# Patient Record
Sex: MALE | Race: WHITE | Employment: STUDENT | URBAN - METROPOLITAN AREA
[De-identification: names, ages, dates, MRNs, and addresses within clinical notes are randomized per-mention and may not be internally consistent; named-entity substitution may affect disease eponyms.]

---

## 2023-05-01 DIAGNOSIS — Q90.9 DOWN'S SYNDROME (HHS-HCC): Primary | ICD-10-CM

## 2023-05-01 PROBLEM — R62.50 DEVELOPMENT DELAY: Status: ACTIVE | Noted: 2023-05-01

## 2023-05-01 PROBLEM — L21.9 SBD (SEBORRHEIC DERMATITIS): Status: ACTIVE | Noted: 2023-05-01

## 2023-05-01 PROBLEM — L73.2 HIDRADENITIS AXILLARIS: Status: ACTIVE | Noted: 2023-05-01

## 2023-05-01 PROBLEM — R29.898 HYPOTONIA: Status: ACTIVE | Noted: 2023-05-01

## 2023-05-01 PROBLEM — F63.3 TRICHOTILLOMANIA IN PEDIATRIC PATIENT: Status: ACTIVE | Noted: 2023-05-01

## 2023-05-01 PROBLEM — E55.9 VITAMIN D DEFICIENCY: Status: ACTIVE | Noted: 2023-05-01

## 2023-05-01 PROBLEM — M62.89 HYPOTONIA: Status: ACTIVE | Noted: 2023-05-01

## 2023-05-01 PROBLEM — R46.89 OUTBURSTS OF EXPLOSIVE BEHAVIOR: Status: ACTIVE | Noted: 2023-05-01

## 2023-05-01 RX ORDER — BENZOYL PEROXIDE 50 MG/ML
LIQUID TOPICAL
COMMUNITY
Start: 2023-03-21

## 2023-05-01 RX ORDER — ASPIRIN 81 MG
TABLET, DELAYED RELEASE (ENTERIC COATED) ORAL
COMMUNITY
Start: 2021-05-05

## 2023-05-01 RX ORDER — ESCITALOPRAM OXALATE 5 MG/5ML
10 SOLUTION ORAL DAILY
COMMUNITY
End: 2023-07-17 | Stop reason: ALTCHOICE

## 2023-05-01 RX ORDER — MUPIROCIN 20 MG/G
1 OINTMENT TOPICAL SEE ADMIN INSTRUCTIONS
COMMUNITY
Start: 2022-06-02

## 2023-05-01 RX ORDER — DOXYCYCLINE 100 MG/1
100 CAPSULE ORAL DAILY
COMMUNITY
Start: 2022-06-02 | End: 2023-07-13 | Stop reason: WASHOUT

## 2023-05-01 RX ORDER — MINOCYCLINE HYDROCHLORIDE 50 MG/1
CAPSULE ORAL
COMMUNITY
Start: 2023-03-21 | End: 2023-12-14 | Stop reason: SDUPTHER

## 2023-05-01 NOTE — TELEPHONE ENCOUNTER
Mom notified, mailed to home address per parent request. Will call office with further questions.

## 2023-07-13 ENCOUNTER — OFFICE VISIT (OUTPATIENT)
Dept: PEDIATRICS | Facility: CLINIC | Age: 18
End: 2023-07-13
Payer: COMMERCIAL

## 2023-07-13 VITALS
SYSTOLIC BLOOD PRESSURE: 112 MMHG | HEIGHT: 65 IN | BODY MASS INDEX: 34.67 KG/M2 | WEIGHT: 208.1 LBS | HEART RATE: 94 BPM | DIASTOLIC BLOOD PRESSURE: 76 MMHG

## 2023-07-13 DIAGNOSIS — E66.9 OBESITY, UNSPECIFIED CLASSIFICATION, UNSPECIFIED OBESITY TYPE, UNSPECIFIED WHETHER SERIOUS COMORBIDITY PRESENT: ICD-10-CM

## 2023-07-13 DIAGNOSIS — Q90.9 DOWN'S SYNDROME (HHS-HCC): Primary | ICD-10-CM

## 2023-07-13 DIAGNOSIS — Z00.00 WELL ADULT EXAM: ICD-10-CM

## 2023-07-13 PROBLEM — Q26.9: Status: ACTIVE | Noted: 2019-09-16

## 2023-07-13 PROBLEM — Q42.3: Status: RESOLVED | Noted: 2022-03-31 | Resolved: 2023-07-13

## 2023-07-13 PROBLEM — K01.1 IMPACTED MOLAR: Status: RESOLVED | Noted: 2019-09-16 | Resolved: 2023-07-13

## 2023-07-13 PROBLEM — K00.9 DENTAL ANOMALY: Status: ACTIVE | Noted: 2019-09-16

## 2023-07-13 PROCEDURE — 1036F TOBACCO NON-USER: CPT | Performed by: PEDIATRICS

## 2023-07-13 PROCEDURE — 3008F BODY MASS INDEX DOCD: CPT | Performed by: PEDIATRICS

## 2023-07-13 PROCEDURE — 99385 PREV VISIT NEW AGE 18-39: CPT | Performed by: PEDIATRICS

## 2023-07-13 NOTE — PATIENT INSTRUCTIONS
Gurdeep was seen today for well child.  Diagnoses and all orders for this visit:  Down's syndrome (Primary)  -     CBC and Auto Differential; Future  -     TSH; Future  -     Thyroxine, Free; Future  Obesity, unspecified classification, unspecified obesity type, unspecified whether serious comorbidity present  -     Lipid Panel; Future  -     Vitamin D, Total; Future  Well adult exam  Body mass index, pediatric, greater than or equal to 95th percentile for age

## 2023-07-13 NOTE — PROGRESS NOTES
Here per self  General Health:  Overall healthy: yes  Concerns today:  Social and Family History:  Any changes?  Nutrition:  Well balanced diet and adequate calcium for age: yes  Dental Care:  Regular dental visits: yes  Brushes twice daily: yes  Elimination:  Elimination patterns appropriate: yes  Sleep:  Adequate sleep: yes  Sleep problems: no      Education/work:  Graduated high school: [employer]  jobs around community    Working:  Activities:  Physical Activity:  Social activities:      RISK factors:  Dating?   Sexual activity?          If yes, form of birth control:  Alcohol?  no  Marijuana? no  Drugs?  no  Smoking? no  Vaping? no    Safety Assessment:  Safety topics were reviewed  Seat belt: yes     Exposure to pets:   Smoke detectors: yes   Sun safety/ Sunscreen: yes   Water safety: yes  Firearms in house:     Internet and texting safety: yes     Review of Systems:  Constitutional: Otherwise denies fever, chills, or changes in behavior. No difficulties with sleeping, eating, drinking, urine output, or bowel movements.    Eyes, ENT: Denies eye complaints, ear complaints, nasal congestion, runny nose, or sore throat.   Cardio/Resp: Denies chest pain, palpitations, shortness of breath, wheezing, stridor at rest, cough, working hard to breathe, or breathing fast.   GI/Renal: Denies nausea, vomiting, stomachache, diarrhea, or constipation. Denies dysuria or abnormal urine color or smell.   Musculoskeletal/Skin: Denies muscle or joint complaints. Denies skin rash.   Neuro/Psych: Denies headache, dizziness, or confusion.  Endo/heme/lymph: Denies excessive thirst, excessive sweating, bruising, bleeding, or swollen glands.     Physical Exam  Vitals reviewed.   Constitutional:          Appearance: Normal appearance  HENT:      Head: Normocephalic.      Right Ear: External ear normal and without deformities. Normal TM.      Left Ear: External ear normal and without deformities. Normal TM.      Nose: Nose normal,  patent nares and without deformities.      Mouth/Throat: Normal palate     Mouth: Mucous membranes are moist.      Pharynx: Oropharynx is clear.   Neck:     General: Normal. No lymphadenopathy.     Eyes:      Extraocular Movements: Extraocular movements intact.      Conjunctiva/sclera: Conjunctivae normal.      Pupils: Pupils are equal, round, and reactive to light.   Cardiovascular:      Rate and Rhythm: Normal rate and regular rhythm.      Pulses: Normal pulses.      Heart sounds: Normal heart sounds.   Pulmonary:      Effort: Pulmonary effort is normal.      Breath sounds: Normal breath sounds.   Abdominal:      General: Abdomen is flat.      Palpations: Abdomen is soft.   Genitourinary:     Normal genitalia  Musculoskeletal:         General: Normal range of motion, strength and tone.     Cervical back: Normal range of motion and neck supple.   Skin:     General: Skin is warm and dry.      No rash or lesions        Neurological:      General: No focal deficit present.      Mental Status:      Assessment/Plan:   Gurdeep was seen today for well child.  Diagnoses and all orders for this visit:  Down's syndrome (Primary)  -     CBC and Auto Differential; Future  -     TSH; Future  -     Thyroxine, Free; Future  Obesity, unspecified classification, unspecified obesity type, unspecified whether serious comorbidity present  -     Lipid Panel; Future  -     Vitamin D, Total; Future  Well adult exam  Body mass index, pediatric, greater than or equal to 95th percentile for age

## 2023-07-15 ENCOUNTER — LAB (OUTPATIENT)
Dept: LAB | Facility: LAB | Age: 18
End: 2023-07-15
Payer: COMMERCIAL

## 2023-07-15 DIAGNOSIS — E66.9 OBESITY, UNSPECIFIED CLASSIFICATION, UNSPECIFIED OBESITY TYPE, UNSPECIFIED WHETHER SERIOUS COMORBIDITY PRESENT: ICD-10-CM

## 2023-07-15 DIAGNOSIS — Q90.9 DOWN'S SYNDROME (HHS-HCC): ICD-10-CM

## 2023-07-15 LAB
BASOPHILS (10*3/UL) IN BLOOD BY AUTOMATED COUNT: 0.04 X10E9/L (ref 0–0.1)
BASOPHILS/100 LEUKOCYTES IN BLOOD BY AUTOMATED COUNT: 0.7 % (ref 0–2)
CALCIDIOL (25 OH VITAMIN D3) (NG/ML) IN SER/PLAS: 29 NG/ML
CHOLESTEROL (MG/DL) IN SER/PLAS: 182 MG/DL (ref 0–199)
CHOLESTEROL IN HDL (MG/DL) IN SER/PLAS: 32.6 MG/DL
CHOLESTEROL/HDL RATIO: 5.6
EOSINOPHILS (10*3/UL) IN BLOOD BY AUTOMATED COUNT: 0.03 X10E9/L (ref 0–0.7)
EOSINOPHILS/100 LEUKOCYTES IN BLOOD BY AUTOMATED COUNT: 0.5 % (ref 0–6)
ERYTHROCYTE DISTRIBUTION WIDTH (RATIO) BY AUTOMATED COUNT: 14.7 % (ref 11.5–14.5)
ERYTHROCYTE MEAN CORPUSCULAR HEMOGLOBIN CONCENTRATION (G/DL) BY AUTOMATED: 33.1 G/DL (ref 32–36)
ERYTHROCYTE MEAN CORPUSCULAR VOLUME (FL) BY AUTOMATED COUNT: 90 FL (ref 80–100)
ERYTHROCYTES (10*6/UL) IN BLOOD BY AUTOMATED COUNT: 4.79 X10E12/L (ref 4.5–5.9)
HEMATOCRIT (%) IN BLOOD BY AUTOMATED COUNT: 43.2 % (ref 41–52)
HEMOGLOBIN (G/DL) IN BLOOD: 14.3 G/DL (ref 13.5–17.5)
IMMATURE GRANULOCYTES/100 LEUKOCYTES IN BLOOD BY AUTOMATED COUNT: 0.5 % (ref 0–0.9)
LDL: 105 MG/DL (ref 0–109)
LEUKOCYTES (10*3/UL) IN BLOOD BY AUTOMATED COUNT: 5.9 X10E9/L (ref 4.4–11.3)
LYMPHOCYTES (10*3/UL) IN BLOOD BY AUTOMATED COUNT: 2.15 X10E9/L (ref 1.2–4.8)
LYMPHOCYTES/100 LEUKOCYTES IN BLOOD BY AUTOMATED COUNT: 36.8 % (ref 13–44)
MONOCYTES (10*3/UL) IN BLOOD BY AUTOMATED COUNT: 0.31 X10E9/L (ref 0.1–1)
MONOCYTES/100 LEUKOCYTES IN BLOOD BY AUTOMATED COUNT: 5.3 % (ref 2–10)
NEUTROPHILS (10*3/UL) IN BLOOD BY AUTOMATED COUNT: 3.29 X10E9/L (ref 1.2–7.7)
NEUTROPHILS/100 LEUKOCYTES IN BLOOD BY AUTOMATED COUNT: 56.2 % (ref 40–80)
NON HDL CHOLESTEROL: 149 MG/DL (ref 0–119)
NRBC (PER 100 WBCS) BY AUTOMATED COUNT: 0 /100 WBC (ref 0–0)
PLATELETS (10*3/UL) IN BLOOD AUTOMATED COUNT: 296 X10E9/L (ref 150–450)
THYROTROPIN (MIU/L) IN SER/PLAS BY DETECTION LIMIT <= 0.05 MIU/L: 3.02 MIU/L (ref 0.44–3.98)
THYROXINE (T4) FREE (NG/DL) IN SER/PLAS: 1.22 NG/DL (ref 0.78–1.48)
TRIGLYCERIDE (MG/DL) IN SER/PLAS: 224 MG/DL (ref 0–149)
VLDL: 45 MG/DL (ref 0–40)

## 2023-07-15 PROCEDURE — 36415 COLL VENOUS BLD VENIPUNCTURE: CPT

## 2023-07-15 PROCEDURE — 80061 LIPID PANEL: CPT

## 2023-07-15 PROCEDURE — 85025 COMPLETE CBC W/AUTO DIFF WBC: CPT

## 2023-07-15 PROCEDURE — 84443 ASSAY THYROID STIM HORMONE: CPT

## 2023-07-15 PROCEDURE — 82306 VITAMIN D 25 HYDROXY: CPT

## 2023-07-15 PROCEDURE — 84439 ASSAY OF FREE THYROXINE: CPT

## 2023-07-17 DIAGNOSIS — F41.9 ANXIETY: Primary | ICD-10-CM

## 2023-07-17 DIAGNOSIS — E78.2 ELEVATED TRIGLYCERIDES WITH HIGH CHOLESTEROL: Primary | ICD-10-CM

## 2023-07-17 DIAGNOSIS — Q90.9 DOWN'S SYNDROME (HHS-HCC): ICD-10-CM

## 2023-07-17 RX ORDER — ESCITALOPRAM OXALATE 10 MG/1
10 TABLET ORAL DAILY
Qty: 90 TABLET | Refills: 0 | Status: SHIPPED | OUTPATIENT
Start: 2023-07-17 | End: 2023-11-09 | Stop reason: SDUPTHER

## 2023-10-16 DIAGNOSIS — Q90.9 DOWN SYNDROME (HHS-HCC): ICD-10-CM

## 2023-10-16 DIAGNOSIS — M62.89 HYPOTONIA: ICD-10-CM

## 2023-11-09 DIAGNOSIS — F41.9 ANXIETY: ICD-10-CM

## 2023-11-09 RX ORDER — ESCITALOPRAM OXALATE 10 MG/1
10 TABLET ORAL DAILY
Qty: 90 TABLET | Refills: 0 | Status: SHIPPED | OUTPATIENT
Start: 2023-11-09 | End: 2024-03-05 | Stop reason: SDUPTHER

## 2023-11-10 ENCOUNTER — OFFICE VISIT (OUTPATIENT)
Dept: ENDOCRINOLOGY | Facility: CLINIC | Age: 18
End: 2023-11-10
Payer: COMMERCIAL

## 2023-11-10 VITALS
SYSTOLIC BLOOD PRESSURE: 111 MMHG | BODY MASS INDEX: 36.37 KG/M2 | DIASTOLIC BLOOD PRESSURE: 62 MMHG | WEIGHT: 213 LBS | HEART RATE: 79 BPM | HEIGHT: 64 IN

## 2023-11-10 DIAGNOSIS — E78.1 HYPERTRIGLYCERIDEMIA: Primary | ICD-10-CM

## 2023-11-10 DIAGNOSIS — E66.9 OBESITY (BMI 30-39.9): ICD-10-CM

## 2023-11-10 PROCEDURE — 3008F BODY MASS INDEX DOCD: CPT | Performed by: STUDENT IN AN ORGANIZED HEALTH CARE EDUCATION/TRAINING PROGRAM

## 2023-11-10 PROCEDURE — 1036F TOBACCO NON-USER: CPT | Performed by: STUDENT IN AN ORGANIZED HEALTH CARE EDUCATION/TRAINING PROGRAM

## 2023-11-10 PROCEDURE — 99204 OFFICE O/P NEW MOD 45 MIN: CPT | Performed by: STUDENT IN AN ORGANIZED HEALTH CARE EDUCATION/TRAINING PROGRAM

## 2023-11-10 RX ORDER — FERROUS SULFATE, DRIED 160(50) MG
1 TABLET, EXTENDED RELEASE ORAL DAILY
COMMUNITY

## 2023-11-10 ASSESSMENT — PAIN SCALES - GENERAL: PAINLEVEL: 0-NO PAIN

## 2023-11-10 NOTE — PROGRESS NOTES
18 M PMH: Downs sydnrome, obesity, hidranetitis suppurativa     Coming in today for evaluation of hyperTG, referred by PCP   Here today with mother     Lipid panel was done as part of routine screening showing ,    Even years prior TG trending in the 200s     Contributing meds:  None   Lexapro- 5 to 6 years     Around 208 lbs     Exercise-none, sedentary, uses time with uses gadget. 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    Social History     Socioeconomic History    Marital status: Single     Spouse name: Not on file    Number of children: Not on file    Years of education: Not on file    Highest education level: Not on file   Occupational History    Not on file   Tobacco Use    Smoking status: Never    Smokeless tobacco: Never   Vaping Use    Vaping Use: Never used   Substance and Sexual Activity    Alcohol use: Not on file    Drug use: Not on file    Sexual activity: Never   Other Topics Concern    Not on file   Social History Narrative    Not on file     Social Determinants of Health     Financial Resource Strain: Not on file   Food Insecurity: Not on file   Transportation Needs:  Not on file   Physical Activity: Not on file   Stress: Not on file   Social Connections: Not on file   Intimate Partner Violence: Not on file   Housing Stability: Not on file   Famhx: CAD maternal grandfather, maternal aunt CAD 49, maternal uncle 50 yo     No complaints   ROS reviewed and is negative except for pertinent findings noted on HPI    Physical Exam  Constitutional:       Comments: Limited to answering simple questions   Cardiovascular:      Rate and Rhythm: Regular rhythm.      Pulses: Normal pulses.      Heart sounds: Normal heart sounds.   Pulmonary:      Effort: Pulmonary effort is normal.      Breath sounds: Normal breath sounds.   Abdominal:      Comments: Abdominal obesity, soft    Musculoskeletal:         General: No swelling. Normal range of motion.      Comments: No proximal myopathy   Skin:     General: Skin is warm.      Comments: No violaceous striae   Neurological:      General: No focal deficit present.      Mental Status: He is alert.         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

## 2023-11-10 NOTE — PATIENT INSTRUCTIONS
-See the nutritionist  -want you to start a low fat and carb controlled diet   -increase your activity!  -get your fasting labs done     Follow up in about 5 months     Joanne Larry MD  Divison of Endocrinology   [facility]   Phone: [phone]    option 4, then option 1  Fax: [phone]

## 2023-12-14 ENCOUNTER — OFFICE VISIT (OUTPATIENT)
Dept: DERMATOLOGY | Facility: CLINIC | Age: 18
End: 2023-12-14
Payer: COMMERCIAL

## 2023-12-14 DIAGNOSIS — L85.8 KERATOSIS PILARIS: ICD-10-CM

## 2023-12-14 DIAGNOSIS — B35.3 TINEA PEDIS OF LEFT FOOT: ICD-10-CM

## 2023-12-14 DIAGNOSIS — L73.2 HIDRADENITIS SUPPURATIVA: Primary | ICD-10-CM

## 2023-12-14 PROCEDURE — 1036F TOBACCO NON-USER: CPT | Performed by: NURSE PRACTITIONER

## 2023-12-14 PROCEDURE — 99214 OFFICE O/P EST MOD 30 MIN: CPT | Performed by: NURSE PRACTITIONER

## 2023-12-14 PROCEDURE — 3008F BODY MASS INDEX DOCD: CPT | Performed by: NURSE PRACTITIONER

## 2023-12-14 RX ORDER — MINOCYCLINE HYDROCHLORIDE 50 MG/1
CAPSULE ORAL
Qty: 60 CAPSULE | Refills: 1 | Status: SHIPPED | OUTPATIENT
Start: 2023-12-14

## 2023-12-14 RX ORDER — HYDROCORTISONE 25 MG/G
CREAM TOPICAL
Qty: 453 G | Refills: 2 | Status: SHIPPED | OUTPATIENT
Start: 2023-12-14

## 2023-12-14 RX ORDER — KETOCONAZOLE 20 MG/G
CREAM TOPICAL
Qty: 15 G | Refills: 3 | Status: SHIPPED | OUTPATIENT
Start: 2023-12-14

## 2023-12-14 NOTE — PROGRESS NOTES
"Subjective     Gurdeep Pritchett is a 18 y.o. male who presents for the following: Hidradenitis Suppurativa (HS follow-up. Stable on current medication regimen. Mom would like examination of \"bumps\" to bilateral arms. ).     Review of Systems:  No other skin or systemic complaints other than what is documented elsewhere in the note.    The following portions of the chart were reviewed this encounter and updated as appropriate:         Skin Cancer History  No skin cancer on file.      Specialty Problems          Dermatology Problems    Hidradenitis axillaris    SBD (seborrheic dermatitis)        Objective   Well appearing patient in no apparent distress; mood and affect are within normal limits.    A full examination was performed including scalp, head, eyes, ears, nose, lips, neck, chest, axillae, abdomen, back, buttocks, bilateral upper extremities, bilateral lower extremities, hands, feet, fingers, toes, fingernails, and toenails. All findings within normal limits unless otherwise noted below.    Assessment/Plan   1. Hidradenitis suppurativa  Left Axilla, Right Axilla, Right Medial Thigh  Dilated comedones, inflammatory papules, sinus tract formation, scarring    - Hidradenitis suppurativa is a chronic condition of clogged sweat glands that leads to inflammation of the skin in areas such as the groin, underarms, underneath the breasts, and in between the buttocks. It most commonly appears as multiple large nodules (solid, raised bumps), abscesses (red, swollen, warm, tender bumps or lumps with pus inside), and tunnels (holes in the skin that may contain fluid such as pus) in these areas. The nodules and abscesses gradually get larger and drain pus. After multiple bouts of this cycle of plugging, enlargement, and drainage, there may be tunnel formation under the skin and scarring. Pain is the most common symptom, but individuals with hidradenitis suppurativa also report itchy lesions, a foul odor coming from lesions " when they drain pus, feeling tired, and joint pain.  - While there is no cure for hidradenitis suppurativa, you can work with your medical professional to treat existing lesions and prevent new ones.  - Make sure to wash any inflamed, draining areas of hidradenitis suppurativa with antibacterial soap, and then apply an antibiotic ointment (Neosporin) and clean bandages. If there is a large amount of drainage, change the gauze pads and dressings often. Warm compresses and ibuprofen (Advil, Motrin) can help reduce the swelling. Avoid wearing tight-fitting clothing to help prevent further irritation.  - Weight loss may decrease lesions by decreasing skin folds and, thus, friction on the skin. Smoking should be avoided.  Plan  - Discussed treatment options, including Cosentyx. Mom will call with questions/concerns.   - Will reach out to our trials team, mom is interested.   - Continue clindamycin lotion, use as directed.   - Will send refill of minocycline, take as discussed.   - Risks, benefits, and side effects discussed in office.     Follow Up In Dermatology - Established Patient - Left Axilla, Right Axilla, Right Medial Thigh    2. Tinea pedis of left foot    3. Keratosis pilaris

## 2023-12-26 ENCOUNTER — TELEPHONE (OUTPATIENT)
Dept: PEDIATRICS | Facility: CLINIC | Age: 18
End: 2023-12-26

## 2023-12-26 ENCOUNTER — OFFICE VISIT (OUTPATIENT)
Dept: PEDIATRICS | Facility: CLINIC | Age: 18
End: 2023-12-26
Payer: COMMERCIAL

## 2023-12-26 VITALS
HEART RATE: 85 BPM | TEMPERATURE: 98 F | OXYGEN SATURATION: 95 % | RESPIRATION RATE: 20 BRPM | BODY MASS INDEX: 35.36 KG/M2 | WEIGHT: 206 LBS

## 2023-12-26 DIAGNOSIS — J98.8 WHEEZING-ASSOCIATED RESPIRATORY INFECTION: Primary | ICD-10-CM

## 2023-12-26 PROCEDURE — 99213 OFFICE O/P EST LOW 20 MIN: CPT | Performed by: PEDIATRICS

## 2023-12-26 PROCEDURE — 1036F TOBACCO NON-USER: CPT | Performed by: PEDIATRICS

## 2023-12-26 PROCEDURE — 3008F BODY MASS INDEX DOCD: CPT | Performed by: PEDIATRICS

## 2023-12-26 RX ORDER — AZITHROMYCIN 200 MG/5ML
POWDER, FOR SUSPENSION ORAL
Qty: 37.7 ML | Refills: 0 | Status: SHIPPED | OUTPATIENT
Start: 2023-12-26 | End: 2023-12-31

## 2023-12-26 RX ORDER — ALBUTEROL SULFATE 0.83 MG/ML
2.5 SOLUTION RESPIRATORY (INHALATION) EVERY 4 HOURS PRN
Qty: 75 ML | Refills: 3 | Status: SHIPPED | OUTPATIENT
Start: 2023-12-26 | End: 2024-12-25

## 2023-12-26 ASSESSMENT — ENCOUNTER SYMPTOMS
MYALGIAS: 0
SORE THROAT: 0
COUGH: 1
EYE REDNESS: 0
APPETITE CHANGE: 0
ABDOMINAL PAIN: 0
HEADACHES: 0
NAUSEA: 0
FATIGUE: 1
VOMITING: 0
RHINORRHEA: 1
EYE DISCHARGE: 0
DIARRHEA: 0
FEVER: 0
SHORTNESS OF BREATH: 1

## 2023-12-26 NOTE — PATIENT INSTRUCTIONS
Give the antibiotic as prescribed.  Contact the office if symptoms are not improving over the next 2-3 days, or if any symptoms are worsening.  Give a probiotic supplement daily or eat yogurt daily  to help prevent stomach upset and diarrhea while on the antibiotic.  Give albuterol in the nebulizer every 4 hours as needed for cough or wheezing.  Contact the office with any concerns.

## 2023-12-26 NOTE — PROGRESS NOTES
Subjective   Patient ID: Gurdeep Pritchett is a 18 y.o. male with history of Down's syndrome  who presents for Cough.  He is accompanied today by his mother.    HPI:  Gurdeep presents with a cough starting about 4 days ago.  He had increased nasal congestion starting 2 days ago, and was noted to be wheezing today.              Review of Systems   Constitutional:  Positive for fatigue. Negative for appetite change and fever.   HENT:  Positive for congestion and rhinorrhea. Negative for ear pain and sore throat.    Eyes:  Negative for discharge and redness.   Respiratory:  Positive for cough and shortness of breath.    Gastrointestinal:  Negative for abdominal pain, diarrhea, nausea and vomiting.   Musculoskeletal:  Negative for myalgias.   Skin:  Negative for rash.   Neurological:  Negative for headaches.       Objective   Pulse 85   Temp 36.7 °C (98 °F)   Resp 20   Wt 93.4 kg (206 lb)   SpO2 95%   BMI 35.36 kg/m²   BSA: 2.05 meters squared  Growth percentiles: Facility age limit for growth %mary is 20 years. Facility age limit for growth %mary is 20 years.     Physical Exam  Vitals reviewed.   HENT:      Right Ear: Tympanic membrane normal.      Left Ear: Tympanic membrane normal.      Nose: Congestion present.      Mouth/Throat:      Mouth: Mucous membranes are moist.      Pharynx: Oropharynx is clear.   Eyes:      Conjunctiva/sclera: Conjunctivae normal.   Cardiovascular:      Rate and Rhythm: Normal rate and regular rhythm.      Heart sounds: Normal heart sounds.   Pulmonary:      Effort: Pulmonary effort is normal.      Breath sounds: Wheezing and rales present.   Musculoskeletal:      Cervical back: Neck supple.   Neurological:      Mental Status: He is alert.         Assessment/Plan   Diagnoses and all orders for this visit:  Wheezing-associated respiratory infection  -     albuterol 2.5 mg /3 mL (0.083 %) nebulizer solution; Take 3 mL (2.5 mg) by nebulization every 4 hours if needed for wheezing.  -      azithromycin (Zithromax) 200 mg/5 mL suspension; Take 12.5 mL (500 mg) by mouth once daily for 1 day, THEN 6.3 mL (250 mg) once daily for 4 days.

## 2023-12-29 ENCOUNTER — LAB (OUTPATIENT)
Dept: LAB | Facility: LAB | Age: 18
End: 2023-12-29
Payer: COMMERCIAL

## 2023-12-29 DIAGNOSIS — E78.1 HYPERTRIGLYCERIDEMIA: ICD-10-CM

## 2023-12-29 PROCEDURE — 83036 HEMOGLOBIN GLYCOSYLATED A1C: CPT

## 2023-12-29 PROCEDURE — 80061 LIPID PANEL: CPT

## 2023-12-29 PROCEDURE — 36415 COLL VENOUS BLD VENIPUNCTURE: CPT

## 2023-12-29 PROCEDURE — 82947 ASSAY GLUCOSE BLOOD QUANT: CPT

## 2023-12-30 LAB
CHOLEST SERPL-MCNC: 167 MG/DL (ref 0–199)
CHOLESTEROL/HDL RATIO: 5.3
EST. AVERAGE GLUCOSE BLD GHB EST-MCNC: 105 MG/DL
GLUCOSE P FAST SERPL-MCNC: 82 MG/DL (ref 74–99)
HBA1C MFR BLD: 5.3 %
HDLC SERPL-MCNC: 31.4 MG/DL
LDLC SERPL CALC-MCNC: 92 MG/DL
NON HDL CHOLESTEROL: 136 MG/DL (ref 0–119)
TRIGL SERPL-MCNC: 216 MG/DL (ref 0–149)
VLDL: 43 MG/DL (ref 0–40)

## 2024-01-23 ENCOUNTER — APPOINTMENT (OUTPATIENT)
Dept: NUTRITION | Facility: CLINIC | Age: 19
End: 2024-01-23
Payer: COMMERCIAL

## 2024-03-05 DIAGNOSIS — F41.9 ANXIETY: ICD-10-CM

## 2024-03-05 RX ORDER — ESCITALOPRAM OXALATE 10 MG/1
10 TABLET ORAL DAILY
Qty: 90 TABLET | Refills: 0 | Status: SHIPPED | OUTPATIENT
Start: 2024-03-05 | End: 2024-06-03

## 2024-03-19 ENCOUNTER — NUTRITION (OUTPATIENT)
Dept: NUTRITION | Facility: CLINIC | Age: 19
End: 2024-03-19
Payer: COMMERCIAL

## 2024-03-19 VITALS — HEIGHT: 64 IN | WEIGHT: 210.54 LBS | BODY MASS INDEX: 35.94 KG/M2

## 2024-03-19 DIAGNOSIS — E78.1 HYPERTRIGLYCERIDEMIA: ICD-10-CM

## 2024-03-19 PROCEDURE — 97802 MEDICAL NUTRITION INDIV IN: CPT | Performed by: DIETITIAN, REGISTERED

## 2024-03-19 NOTE — LETTER
March 19, 2024     Patient: Gurdeep Pritchett   YOB: 2005   Date of Visit: 3/19/2024       To Whom It May Concern:    Gurdeep Pritchett was seen in my clinic on 3/19/2024 at 8:30 am. Please excuse Gurdeep for his absence from school on this day to make the appointment.    If you have any questions or concerns, please don't hesitate to call.         Sincerely,         Christel Hines, RENATE, LD        CC: No Recipients

## 2024-03-19 NOTE — PROGRESS NOTES
"Reason for Nutrition Visit:  Pt is a 18 y.o. male being seen at [facility]. Pt was referred by  Joanne Larry MD effective March 19, 2024.   1. 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 Take by mouth 1 daily, Disp: , Rfl:     carbamide peroxide (Debrox) 6.5 % otic solution, Administer into affected ear(s)., Disp: , Rfl:     escitalopram (Lexapro) 10 mg tablet, Take 1 tablet (10 mg) by mouth once daily., Disp: 90 tablet, Rfl: 0    hydrocortisone 2.5 % cream, Thin coat to affected skin on arms and corners of mouth twice daily for 3-4 weeks as needed., Disp: 453 g, Rfl: 2    ketoconazole (NIZOral) 2 % cream, Daily to corners of mouth until controlled, then 1-2 times weekly., Disp: 15 g, Rfl: 3    minocycline 50 mg capsule, TAKE 1 Capsule twice a day by mouth with full glass of water. Don't take just prior to bedtime. Wear sunscreen while taking this medication., Disp: 60 capsule, Rfl: 1    mupirocin (Bactroban) 2 % ointment, Apply 1 Application topically see administration instructions. Apply as directed by physician, Disp: , Rfl:      Anthropometrics:  Anthropometrics  Height: 162.6 cm (5' 4.02\")  Weight: 95.5 kg (210 lb 8.6 oz)  BMI (Calculated): 36.12   Weight change:    Significant Weight Change: No    Lab Results " "  Component Value Date    HGBA1C 5.3 12/29/2023    CHOL 167 12/29/2023    LDLF 105 07/15/2023    TRIG 216 (H) 12/29/2023    HDL 31.4 12/29/2023        Chemistry    No results found for: \"NA\", \"K\", \"CL\", \"CO2\", \"BUN\", \"CREATININE\", \"GLU\" Lab Results   Component Value Date/Time    AST 20 10/01/2021 1417    ALT 29 10/01/2021 1417           Food and Nutrition Hx:  Pt is present with mom, Mar. Pt lives with mom.   Pt has preferences for food. He does like broccoli. He does not like a lot of healthy foods Mar prefers to eat.   He attends a day program that they cook and prepare foods. It is called [facility] [employer]. The food provided and consumes is not really known.   He likes brown beans, carrots, and green beans. He likes to include corn and peas. He likes apple sauce, oranges and banana. He consumes white fish and salmon.     Pt wakes up at 6:00.   3 meals are consumed and may be a snack.     24 Diet Recall:  Meal 1: Breakfast is by 7:00 am to include waffles with butter and milk or a poptart.   Meal 2: Lunch is a 11:30 to include a meal prepared at the consumed at the [employer]. The pt will have 8 items they select for and go shopping for the meals. Examples may be pasta, fish sandwich, cheese sandwiches.   Pt can take a nap in the afternoon.   Snack may be a cheese stick and corn chips and 5 slices of salami or Macaroni and cheese.   Meal 3: 2 cups of pasta with 3-4 meat balls and texas bread with cheese.    Snacks:  Beverages:Sugar free  Lemon-aide, diet orange pop, 2% milk, once in awhile chocolate milk.        Allergies: None  Intolerance: None  Appetite: Normal  Intake: >75%  GI Symptoms : diarrhea Frequency: frequent. Pt was born with GI issues. Bowel movements are type 5.   Swallowing Difficulty: Choking occurs rarely. Mom reports pt tends to eat fast. She cuts the food up on small bites to avoid chocking. Mom denies an hx of having a swallow study done.   Dentition : own    Types of Activities: Mostly " "Sedentary    Sleep duration/quality : 7+ hours and disrupted sleep    Supplements: Vitamin D and Prebiotic Fiber daily    Food Preparation: Family  Cooking Skills/Barriers: None reported  Grocery Shopping: Family    Eating Out Type: Restaurant  1 times per week    Nutrition Focused Physical Exam:    Performed/Deferred: Deferred as pt visually appears well-nourished with no signs of malnutrition    Other Physical Findings:  Hair: None  None  Mouth: None  Skin: None  Nails: None  none    Malnutrition Present: No    Estimated Energy Needs:  Energy Needs  Calculated Energy Needs Using Equations  Height: 162.6 cm (5' 4.02\")  Weight Used for Equation Calculations: 95.5 kg (210 lb 8.6 oz)  [employer] Equation (Overweight or Obese Patients): 1876  Equation Chosen to Use by RD: [employer]  Activity Factor: 1.2  Total Energy Needs: 2251  Estimated Energy Needs  Total Energy Estimated Needs (kCal): 1750 kCal     Nutrition Diagnosis:    Diagnosis Statement 1:  Diagnosis Status: New  Diagnosis : Food and nutrition related knowledge deficit related to  how to eat to reduce TG levels  as evidenced by  reports by pt's mother of the need to learn.    Diagnosis Statement 2:  Diagnosis Status: New  Diagnosis : Inadequate fiber intake  related to  preference of consumption of grains and starches with limited fiber intake  as evidenced by estimated intake of fiber that is insufficient when compared to recommended amounts (38 g/day for men and 25 g/day for women)    Diagnosis Statement 3:   Diagnosis Status: New  Diagnosis : Altered nutrition related lab values  related to  multiple nutrition factors such as high CHO intake, low fiber, overweight, high kcal intake and limited activity to name a few  as evidenced by  TG levels has been above target.     Nutrition Interventions:  Medical nutrition therapy was given for TG and for wt loss.   Nutrition Counseling: Motivational Interviewing and CBT  Coordination of Care: " None    Nutrition Monitoring and Evaluation:  Weight  TG levels  Energy in the afternoon    Nutrition Goals:  1,750 calories per day for 1 lb wt loss per week. Heart healthy meal plan with a low saturated fat intake to <5 -6 % of energy (less than 11 grams of saturated fat per day), reduced intake of added sugars (<10% of total energy), 38 grams of fiber with intake of viscous fiber to 5 g/day to 10 g/day, plant sterols/stanols to 2 g/day, and 1.6 gram of omega three fatty acids to reduce LDL cholesterol.    Nutrition Recommendations:  Via teach back method patient verbalized understanding of the following topics:  1) Aim for three meals and 1 snacks.   2) Strive to include protein at the meals and even snacks. Protein at meals can increase the metabolism.  Protein at snacks and meals can sustain energy, stabilize blood glucose, and provide more contentment. Protein foods include egg whites, low fat cheese, nuts, nut butters, lean poultry, lean meat, fish, or tofu. Other ways to obtain protein include [place] oatmeal, pancakes, and waffles that contain high protein within the grain. Consider switching from regular milk to Fairlife.   3) The recommendation for a high fiber diet for men is to consume 38 grams of fiber per day. In order to achieve this consume 2 -3 fruit per day, 3 -4 cups of vegetables, and whole grain at 1 -2 meals per day. Strive to offer fruit and both breakfast and snack. At dinner, consider doubling the amount of vegetables and offering it first.   4) Whole grain foods are foods that contain the whole grain kernel. In order to identify whole grain, examine the list of ingredients. The first ingredient needs to state one of the following to be considered a whole grain: Brown rice, bulgur, cristal flour, oatmeal, whole-grain corn, whole oats, whole rye, whole wheat, wild rice, or whole wheat flour.    Educational Handouts: Triglyceride Nutrition Therapy     Christel Hines MS, RDN, LD, HALEY    Advanced Practice Clinical Dietitian  [email or web address]  Schedule line [phone]  Direct line [phone]     Readiness to Change : Good  Level of Understanding : Good  Anticipated Compliant : Good

## 2024-03-19 NOTE — PATIENT INSTRUCTIONS
1) Aim for three meals and 1 snacks.   2) Strive to include protein at the meals and even snacks. Protein at meals can increase the metabolism.  Protein at snacks and meals can sustain energy, stabilize blood glucose, and provide more contentment. Protein foods include egg whites, low fat cheese, nuts, nut butters, lean poultry, lean meat, fish, or tofu. Other ways to obtain protein include [place] oatmeal, pancakes, and waffles that contain high protein within the grain. Consider switching from regular milk to Fairlife.   3) The recommendation for a high fiber diet for men is to consume 38 grams of fiber per day. In order to achieve this consume 2 -3 fruit per day, 3 -4 cups of vegetables, and whole grain at 1 -2 meals per day. Strive to offer fruit and both breakfast and snack. At dinner, consider doubling the amount of vegetables and offering it first.   4) Whole grain foods are foods that contain the whole grain kernel. In order to identify whole grain, examine the list of ingredients. The first ingredient needs to state one of the following to be considered a whole grain: Brown rice, bulgur, cristal flour, oatmeal, whole-grain corn, whole oats, whole rye, whole wheat, wild rice, or whole wheat flour.    Educational Handouts: Triglyceride Nutrition Therapy     Christel Hines, MS, RDN, LD, HALEY   Advanced Practice Clinical Dietitian  [email or web address]  Schedule line [phone]  Direct line [phone]

## 2024-04-12 ENCOUNTER — APPOINTMENT (OUTPATIENT)
Dept: ENDOCRINOLOGY | Facility: CLINIC | Age: 19
End: 2024-04-12
Payer: COMMERCIAL

## 2024-04-16 ENCOUNTER — APPOINTMENT (OUTPATIENT)
Dept: NUTRITION | Facility: CLINIC | Age: 19
End: 2024-04-16
Payer: COMMERCIAL

## 2024-06-03 ENCOUNTER — APPOINTMENT (OUTPATIENT)
Dept: DERMATOLOGY | Facility: CLINIC | Age: 19
End: 2024-06-03
Payer: COMMERCIAL

## 2024-07-10 DIAGNOSIS — L73.2 HIDRADENITIS SUPPURATIVA: ICD-10-CM

## 2024-07-11 RX ORDER — MINOCYCLINE HYDROCHLORIDE 50 MG/1
CAPSULE ORAL
Qty: 60 CAPSULE | Refills: 1 | Status: SHIPPED | OUTPATIENT
Start: 2024-07-11

## 2024-07-12 DIAGNOSIS — F41.9 ANXIETY: ICD-10-CM

## 2024-07-13 RX ORDER — ESCITALOPRAM OXALATE 10 MG/1
10 TABLET ORAL DAILY
Qty: 90 TABLET | Refills: 0 | Status: SHIPPED | OUTPATIENT
Start: 2024-07-13 | End: 2024-10-11

## 2024-07-15 ENCOUNTER — APPOINTMENT (OUTPATIENT)
Dept: PEDIATRICS | Facility: CLINIC | Age: 19
End: 2024-07-15
Payer: COMMERCIAL

## 2024-07-15 VITALS
SYSTOLIC BLOOD PRESSURE: 126 MMHG | DIASTOLIC BLOOD PRESSURE: 64 MMHG | BODY MASS INDEX: 34.99 KG/M2 | WEIGHT: 210 LBS | HEIGHT: 65 IN

## 2024-07-15 DIAGNOSIS — L30.8 PSORIASIFORM ECZEMA: ICD-10-CM

## 2024-07-15 DIAGNOSIS — Z00.00 WELL ADULT HEALTH CHECK: Primary | ICD-10-CM

## 2024-07-15 DIAGNOSIS — Q90.9 DOWN'S SYNDROME (HHS-HCC): ICD-10-CM

## 2024-07-15 PROCEDURE — 3008F BODY MASS INDEX DOCD: CPT | Performed by: PEDIATRICS

## 2024-07-15 PROCEDURE — 99395 PREV VISIT EST AGE 18-39: CPT | Performed by: PEDIATRICS

## 2024-07-15 PROCEDURE — 1036F TOBACCO NON-USER: CPT | Performed by: PEDIATRICS

## 2024-07-15 RX ORDER — BETAMETHASONE VALERATE 1.2 MG/G
OINTMENT TOPICAL 2 TIMES DAILY
Qty: 45 G | Refills: 2 | Status: SHIPPED | OUTPATIENT
Start: 2024-07-15 | End: 2025-07-15

## 2024-07-15 NOTE — PATIENT INSTRUCTIONS
Assessment/Plan:  Gurdeep was seen today for well child.  Diagnoses and all orders for this visit:  Well adult health check (Primary)  Down's syndrome ([facility])  -     Referral to Adult Sleep Medicine; Future  BMI 35.0-35.9,adult  -     Lipid Panel; Future  -     CBC and Auto Differential; Future  -     TSH; Future  -     Thyroxine, Free  Psoriasiform eczema  -     betamethasone valerate (Valisone) 0.1 % ointment; Apply topically 2 times a day. Apply to affected area  Other orders  -     1 Year Follow Up In Pediatrics; Future  Neutrogena t-gel to plaques  Follow up with endocrine and GI

## 2024-07-15 NOTE — PROGRESS NOTES
Here per self  General Health:  Overall healthy: yes  Concerns today:diet is aproblem for carbs chrissie Potatoes  Social and Family History:  Any changes?  Nutrition:  Well balanced diet and adequate calcium for age: yes  Dental Care:  Regular dental visits: yes  Brushes twice daily: yes  Elimination:  Elimination patterns appropriate: yes  Sleep:  Adequate sleep: yes  Sleep problems: no      Education/work:   [employer] program  Activities:  Physical Activity: baseball  Social activities:      RISK factors:  Dating? no  Sexual activity?   no       If yes, form of birth control:  Alcohol?  no  Marijuana? no  Drugs?  no  Smoking? no  Vaping? no    Safety Assessment:  Safety topics were reviewed  Seat belt: yes     Driving without distractions: yes  Exposure to pets:   Smoke detectors: yes   Sun safety/ Sunscreen: yes   Water safety: yes  Firearms in house:     Internet and texting safety: yes     Review of Systems:  Constitutional: Otherwise denies fever, chills, or changes in behavior. No difficulties with sleeping, eating, drinking, urine output, or bowel movements.    Eyes, ENT: Denies eye complaints, ear complaints, nasal congestion, runny nose, or sore throat.   Cardio/Resp: Denies chest pain, palpitations, shortness of breath, wheezing, stridor at rest, cough, working hard to breathe, or breathing fast.   GI/Renal: Denies nausea, vomiting, stomachache, diarrhea, or constipation. Denies dysuria or abnormal urine color or smell.   Musculoskeletal/Skin: Denies muscle or joint complaints. Denies skin rash.   Neuro/Psych: Denies headache, dizziness, or confusion.  Endo/heme/lymph: Denies excessive thirst, excessive sweating, bruising, bleeding, or swollen glands.     Physical Exam  Vitals reviewed.   Constitutional:          Appearance:  downs apperance  HENT:      Head: Normocephalic.      Right Ear: External ear normal and without deformities. Normal TM.      Left Ear: External ear normal and without  deformities. Normal TM.      Nose: Nose normal, patent nares and without deformities.      Mouth/Throat: Normal palate     Mouth: Mucous membranes are moist.      Pharynx: Oropharynx is clear.   Neck:     General: Normal. No lymphadenopathy.     Eyes:   nystagmus     Conjunctiva/sclera: Conjunctivae normal.      Pupils: Pupils are equal, round, and reactive to light.   Cardiovascular:      Rate and Rhythm: Normal rate and regular rhythm.      Pulses: Normal pulses.      Heart sounds: Normal heart sounds.   Pulmonary:      Effort: Pulmonary effort is normal.      Breath sounds: Normal breath sounds.   Abdominal:      General: Abdomen is flat.      Palpations: Abdomen is soft.   Genitourinary:     Normal genitalia  Musculoskeletal:         General: Normal range of motion, strength and tone.     Cervical back: Normal range of motion and neck supple.   Skin:     General: Skin is warm and dry.       Plaque on right knee and left elbow        Neurological:      General: No focal deficit present.      Mental Status:      Assessment/Plan:  Gurdeep was seen today for well child.  Diagnoses and all orders for this visit:  Well adult health check (Primary)  Down's syndrome ([facility])  -     Referral to Adult Sleep Medicine; Future  BMI 35.0-35.9,adult  -     Lipid Panel; Future  -     CBC and Auto Differential; Future  -     TSH; Future  -     Thyroxine, Free  Psoriasiform eczema  -     betamethasone valerate (Valisone) 0.1 % ointment; Apply topically 2 times a day. Apply to affected area  Other orders  -     1 Year Follow Up In Pediatrics; Future  Neutrogena t-gel to plaques  Follow up with endocrine and GI

## 2024-08-03 ENCOUNTER — LAB (OUTPATIENT)
Dept: LAB | Facility: LAB | Age: 19
End: 2024-08-03
Payer: COMMERCIAL

## 2024-08-03 LAB
BASOPHILS # BLD AUTO: 0.04 X10*3/UL (ref 0–0.1)
BASOPHILS NFR BLD AUTO: 0.7 %
CHOLEST SERPL-MCNC: 171 MG/DL (ref 0–199)
CHOLESTEROL/HDL RATIO: 5.3
EOSINOPHIL # BLD AUTO: 0.02 X10*3/UL (ref 0–0.7)
EOSINOPHIL NFR BLD AUTO: 0.4 %
ERYTHROCYTE [DISTWIDTH] IN BLOOD BY AUTOMATED COUNT: 15.4 % (ref 11.5–14.5)
HCT VFR BLD AUTO: 42.8 % (ref 41–52)
HDLC SERPL-MCNC: 32.5 MG/DL
HGB BLD-MCNC: 13.6 G/DL (ref 13.5–17.5)
IMM GRANULOCYTES # BLD AUTO: 0.03 X10*3/UL (ref 0–0.7)
IMM GRANULOCYTES NFR BLD AUTO: 0.6 % (ref 0–0.9)
LDLC SERPL CALC-MCNC: 93 MG/DL
LYMPHOCYTES # BLD AUTO: 1.69 X10*3/UL (ref 1.2–4.8)
LYMPHOCYTES NFR BLD AUTO: 31 %
MCH RBC QN AUTO: 29.9 PG (ref 26–34)
MCHC RBC AUTO-ENTMCNC: 31.8 G/DL (ref 32–36)
MCV RBC AUTO: 94 FL (ref 80–100)
MONOCYTES # BLD AUTO: 0.37 X10*3/UL (ref 0.1–1)
MONOCYTES NFR BLD AUTO: 6.8 %
NEUTROPHILS # BLD AUTO: 3.3 X10*3/UL (ref 1.2–7.7)
NEUTROPHILS NFR BLD AUTO: 60.5 %
NON HDL CHOLESTEROL: 139 MG/DL (ref 0–149)
NRBC BLD-RTO: 0 /100 WBCS (ref 0–0)
PLATELET # BLD AUTO: 297 X10*3/UL (ref 150–450)
RBC # BLD AUTO: 4.55 X10*6/UL (ref 4.5–5.9)
TRIGL SERPL-MCNC: 230 MG/DL (ref 0–149)
VLDL: 46 MG/DL (ref 0–40)
WBC # BLD AUTO: 5.5 X10*3/UL (ref 4.4–11.3)

## 2024-08-03 PROCEDURE — 80061 LIPID PANEL: CPT

## 2024-08-03 PROCEDURE — 84443 ASSAY THYROID STIM HORMONE: CPT

## 2024-08-03 PROCEDURE — 85025 COMPLETE CBC W/AUTO DIFF WBC: CPT

## 2024-08-04 LAB — TSH SERPL-ACNC: 2.37 MIU/L (ref 0.44–3.98)

## 2024-08-12 DIAGNOSIS — E78.1 HYPERTRIGLYCERIDEMIA: ICD-10-CM

## 2024-08-12 DIAGNOSIS — Q90.9 DOWN'S SYNDROME (HHS-HCC): ICD-10-CM

## 2024-08-15 ENCOUNTER — TELEPHONE (OUTPATIENT)
Dept: PEDIATRICS | Facility: CLINIC | Age: 19
End: 2024-08-15
Payer: COMMERCIAL

## 2024-08-15 DIAGNOSIS — R46.89 BEHAVIOR CONCERN: Primary | ICD-10-CM

## 2024-08-15 RX ORDER — ESCITALOPRAM OXALATE 20 MG/1
20 TABLET ORAL DAILY
Qty: 30 TABLET | Refills: 0 | Status: SHIPPED | OUTPATIENT
Start: 2024-08-15 | End: 2024-09-14

## 2024-08-15 NOTE — PROGRESS NOTES
Please take 15 mg of Lexapro for 7 days and then increase to 20 mg (2 tablets of what you have) and I am sending in a 20 mg Lexapro prescription.  We will follow-up in 1 month to see how this is working.

## 2024-09-14 DIAGNOSIS — L85.8 KERATOSIS PILARIS: ICD-10-CM

## 2024-09-16 RX ORDER — HYDROCORTISONE 25 MG/G
CREAM TOPICAL
Qty: 453.6 G | Refills: 2 | Status: SHIPPED | OUTPATIENT
Start: 2024-09-16

## 2024-09-18 ENCOUNTER — APPOINTMENT (OUTPATIENT)
Dept: DERMATOLOGY | Facility: CLINIC | Age: 19
End: 2024-09-18
Payer: COMMERCIAL

## 2024-09-18 DIAGNOSIS — L73.2 HIDRADENITIS SUPPURATIVA: ICD-10-CM

## 2024-09-18 DIAGNOSIS — L40.9 PSORIASIS: Primary | ICD-10-CM

## 2024-09-18 DIAGNOSIS — K13.0 ANGULAR CHEILITIS: ICD-10-CM

## 2024-09-18 PROCEDURE — 99214 OFFICE O/P EST MOD 30 MIN: CPT | Performed by: NURSE PRACTITIONER

## 2024-09-18 PROCEDURE — 1036F TOBACCO NON-USER: CPT | Performed by: NURSE PRACTITIONER

## 2024-09-18 RX ORDER — CLINDAMYCIN PHOSPHATE 10 UG/ML
LOTION TOPICAL
Qty: 60 ML | Refills: 8 | Status: SHIPPED | OUTPATIENT
Start: 2024-09-18

## 2024-09-18 NOTE — PROGRESS NOTES
Subjective   Gurdeep Pritchett is a 19 y.o. male who presents for the following: Hidradenitis Suppurativa. Flaring to left axilla, open, minimal draining.       Objective   Well appearing patient in no apparent distress; mood and affect are within normal limits.    A focused examination was performed including extremities, including the arms, hands, fingers, and fingernails and the legs, feet, toes, and toenails and head, including the scalp, face, neck, nose, ears, eyelids, and lips. All findings within normal limits unless otherwise noted below.    Left Elbow - Posterior, Right Knee - Anterior  Well-demarcated erythematous papules and plaques with overlying silvery scale.    Body surface area:  3%   Joint abnormalities absent          Left Axilla, Right Axilla, Right Medial Thigh  Dilated comedones, inflammatory papules, sinus tract formation, scarring    Left Oral Commissure, Right Oral Commissure  Erythema and fissures of oral commissures.       Assessment/Plan   Psoriasis  Left Elbow - Posterior; Right Knee - Anterior    - Psoriasis is a common, noncontagious condition that can present in a variety of ways in the skin. The subtypes of this condition include plaque, inverse (or skin fold), guttate, erythrodermic, and pustular psoriasis. Plaque psoriasis, which represents approximately 85% of psoriasis cases, is a lifelong skin condition that affects about 2%-3% of the population worldwide. Plaque psoriasis skin lesions are typically red and raised with overlying scale. There may be papules (small, raised bumps) or plaques (larger, raised skin lesions that are bigger than a thumbnail), or both. People with plaque psoriasis typically have thickened, white scaly patches on their skin.  - While the exact cause of psoriasis is unknown, this condition is the result of an overactive immune system that attacks the skin and other organs of the body. Psoriasis is very common in some families, suggesting a likely genetic  "component contributing to this disease, but it can also occur in individuals with no family history of psoriasis. Psoriasis can be triggered by certain environmental causes, such as emotional stress, pregnancy, injury to the skin, bacterial skin infections such as a streptococcal infection (\"strep\"), smoking or alcohol consumption, and ingesting certain medications.  - Because plaque psoriasis is a lifelong, chronic condition that currently has no cure, the goal of treatment is to decrease the number of skin lesions and reduce symptoms such as itching and pain. Most beneficial treatments for plaque psoriasis work in part due to their ability to alleviate the body's abnormal immune attack of the skin and help prevent the excessive buildup of skin cells or flakes.  - Bathe daily to help soften scales and moisten the skin. Avoid harsh soaps and scrubbing the skin as these may worsen psoriasis. Moisturizing soaps and soap substitutes, such as unscented Dove Sensitive Skin Beauty Bar, Vanicream Cleansing Bar, and CeraVe Psoriasis Cleanser, are milder products for the skin.  - The application of moisturizers after water exposure or bathing may be helpful. Heavier oil-based moisturizers help to retain water in the skin better than water-based moisturizers. Thicker moisturizers such as petroleum jelly (Vaseline), Aquaphor Healing Ointment, - Eucerin Original Healing Cream, Vanicream, Aveeno Moisturizing Cream, CeraVe Healing Ointment, or CeraVe Moisturizing Cream can be applied to damp skin daily after bathing. Use cream and ointments rather than lotions because lotions can dry out the skin.  - Apply over-the-counter hydrocortisone cream or ointment (0.5% or 1%) twice daily for 2-3 weeks at a time to help reduce itch and irritation. Stronger topical steroids are typically required for thicker psoriasis plaques. Long-term use of topical steroids should include periodic times of no treatment each month to avoid thinning of " the skin.  - Use of products with salicylic acid (shampoos, cleansers, and ointments), such as Neutrogena T/Sal, can help soften and remove thick psoriasis scale in the scalp.  - Small doses of natural sunlight may be helpful, such as 10-15 minutes approximately 2 or 3 times per week. Avoid too much sun; however, and protect your healthy skin from excessive sun exposure to help prevent premature aging of the skin and skin cancers.  - Follow a healthy diet to maintain an ideal weight. (Being overweight may make plaque psoriasis worse.)  Patient Support Resources  - The National Psoriasis Foundation (https://www.psoriasis.org/) is a useful resource for patients and health professionals that has additional information regarding psoriasis and the various available treatments. The National Psoriasis Foundation website includes access to psoriasis-related articles, psoriasis research, a directory of health care professionals with an expertise in psoriasis, and opportunities for patients to volunteer or get involved in upcoming events.  Plan  - Continue betamethasone cream, please call if you need refills.   - Discussed risks, benefits, and side effects of medications.     Hidradenitis suppurativa  Left Axilla; Right Axilla; Right Medial Thigh    - Hidradenitis suppurativa is a chronic condition of clogged sweat glands that leads to inflammation of the skin in areas such as the groin, underarms, underneath the breasts, and in between the buttocks. It most commonly appears as multiple large nodules (solid, raised bumps), abscesses (red, swollen, warm, tender bumps or lumps with pus inside), and tunnels (holes in the skin that may contain fluid such as pus) in these areas. The nodules and abscesses gradually get larger and drain pus. After multiple bouts of this cycle of plugging, enlargement, and drainage, there may be tunnel formation under the skin and scarring. Pain is the most common symptom, but individuals with  hidradenitis suppurativa also report itchy lesions, a foul odor coming from lesions when they drain pus, feeling tired, and joint pain.  - While there is no cure for hidradenitis suppurativa, you can work with your medical professional to treat existing lesions and prevent new ones.  - Make sure to wash any inflamed, draining areas of hidradenitis suppurativa with antibacterial soap, and then apply an antibiotic ointment (Neosporin) and clean bandages. If there is a large amount of drainage, change the gauze pads and dressings often. Warm compresses and ibuprofen (Advil, Motrin) can help reduce the swelling. Avoid wearing tight-fitting clothing to help prevent further irritation.  - Weight loss may decrease lesions by decreasing skin folds and, thus, friction on the skin. Smoking should be avoided.  Plan  - Continue clindamycin lotion, use as directed.   - Discontinue minocycline for now, the condition is stable.   - Please call me if the Gurdeep's condition flares.   - Risks, benefits, and side effects discussed in office.     Related Procedures  Follow Up In Dermatology - Established Patient    Related Medications  minocycline 50 mg capsule  TAKE 1 Capsule twice a day by mouth with full glass of water. Don't take just prior to bedtime. Wear sunscreen while taking this medication.    clindamycin (Cleocin T) 1 % lotion  Thin coat to to affected areas daily.    Angular cheilitis  Left Oral Commissure; Right Oral Commissure    - Angular cheilitis, sometimes called perlèche, is long-term inflammation of the corners of the mouth. It can be associated with a fungal (candidal) or bacterial (staphylococcal) infection, and those affected may also have oral candidiasis (thrush). It may be a result of irritation and more rarely may occur from an underlying nutritional deficiency. The corners of the mouth may be uncomfortable or slightly painful. Angular cheilitis can last from days to months, depending on whether treatment is  sought, and the condition may come and go.  - Start hydrocortisone cream and continue ketoconazole cream as discussed. Please call if you need refills    - Risks, benefits, and side effects discussed with patient.     Follow up in 6 months. Please call me if there are any changes or development of concerning symptoms (lesion/skin condition is changing, bleeding, enlarging, or worsening).

## 2024-09-27 DIAGNOSIS — R46.89 BEHAVIOR CONCERN: ICD-10-CM

## 2024-09-30 RX ORDER — ESCITALOPRAM OXALATE 20 MG/1
20 TABLET ORAL DAILY
Qty: 90 TABLET | Refills: 0 | Status: SHIPPED | OUTPATIENT
Start: 2024-09-30

## 2024-11-25 ENCOUNTER — TELEPHONE (OUTPATIENT)
Dept: DERMATOLOGY | Facility: CLINIC | Age: 19
End: 2024-11-25
Payer: COMMERCIAL

## 2024-11-25 NOTE — TELEPHONE ENCOUNTER
Pt's mother contacted office stating that pt needs a refill on the clindamycin lotion but the pharmacy is telling her the prescription is . Contacted pharmacy regarding prescription and they stated they are able to refill the medication. Called pt's mother back and LVM stating that pharmacy was contacted and she should be able to get a refill at this time. Callback number left and pt's mother advised to contact office with questions or concerns.     Ana Van RN

## 2025-01-02 ENCOUNTER — OFFICE VISIT (OUTPATIENT)
Dept: SLEEP MEDICINE | Facility: CLINIC | Age: 20
End: 2025-01-02
Payer: COMMERCIAL

## 2025-01-02 VITALS
HEART RATE: 76 BPM | SYSTOLIC BLOOD PRESSURE: 106 MMHG | WEIGHT: 210.76 LBS | OXYGEN SATURATION: 98 % | DIASTOLIC BLOOD PRESSURE: 68 MMHG | HEIGHT: 64 IN | BODY MASS INDEX: 35.98 KG/M2

## 2025-01-02 DIAGNOSIS — Q90.9 DOWN'S SYNDROME (HHS-HCC): ICD-10-CM

## 2025-01-02 DIAGNOSIS — G47.30 SLEEP APNEA, UNSPECIFIED TYPE: Primary | ICD-10-CM

## 2025-01-02 PROCEDURE — 99213 OFFICE O/P EST LOW 20 MIN: CPT | Performed by: PHYSICIAN ASSISTANT

## 2025-01-02 NOTE — PATIENT INSTRUCTIONS
[facility] Sleep Medicine  [address]  [address]   [place] [address]       NAME: Gurdeep Pritchett   DATE: 01/02/25    Your Sleep Provider Today: Eulalia Recinos PA-C  Your Primary Care Physician: Charity Spencer MD   Your Referring Provider: Charity Spencer, *    DIAGNOSIS:   1. Down's syndrome ([facility])  Referral to Adult Sleep Medicine          Thank you for coming to the Sleep Medicine Clinic today! Your sleep medicine provider today was: Eulalia Recinos PA-C Below is a summary of your treatment plan, other important information, and our contact numbers:      TREATMENT PLAN   Nice to meet you today!  Avoid driving or operating machinery if drowsy  Lets get your sleep study done, the lab will call you to schedule        IMPORTANT INFORMATION     Call 911 for medical emergencies.  Our offices are generally open from Monday-Friday, 9 am - 5 pm.  If you need to get in touch with me, you may either call me and my team(number is below) or you can use [employer].  If a referral for a test, for CPAP, or for another specialist was made, and you have not heard about scheduling this within a week, please call scheduling at [phone]).  If you are unable to make your appointment for clinic or an overnight study, kindly call the office at least 48 hours in advance to cancel and reschedule.  If you are on CPAP, please bring your device's card or the device to each clinic appointment.   There are no supporting services by either the sleep doctors or their staff on weekends and Holidays, or after 5 PM on weekdays.   If you have been asked to come to a sleep study, make sure you bring toiletries, a comfy pillow, and any nighttime medications that you may regularly take. Also be sure to eat dinner before you arrive. We generally do not provide meals.      PRESCRIPTIONS     We require 7 days advanced notice for prescription refills. If we do not receive the  request in this time, we cannot guarantee that your medication will be refilled in time.      IMPORTANT PHONE NUMBERS     Sleep Medicine Clinic Fax: [phone]  Appointments (for Adult Sleep Clinic): [phone] - option 2  Appointments (For Sleep Studies): [phone] - option 3  [employer] (DME): [phone]  Behavioral Sleep Medicine: [phone]  Sleep Surgery: [phone]  ENT (Otolaryngology): [phone]  Headache Clinic (Neurology): [phone]  Neurology: [phone]  Psychiatry: [phone]  Pulmonary Function Testing (PFT) Center: [phone]  Pulmonary Medicine: [phone]    [employer] (DME): [phone]  [employer] (DME): [phone]  [facility] ([facility]): [phone]      OUR ADULT SLEEP MEDICINE TEAM   Please do not hesitate to call the office or sleep nurse with any questions between appointments:    Adult Sleep Nurses (Radha Chaidez, RN and Margaret Neff RN):  For clinical questions and refilling prescriptions: [phone]  Email sleep diaries and other documents at: [email or web address]    Adult Sleep Medicine Secretaries:  Zuleima Anne (For Hugo/Hernandez/Krise/Strohl/Yeh/Vargas):   P: [phone]  F: [phone]  Lexii Conway (For Arroyo/Guggenbiller): P: [phone]  Fax: [phone]  Peg Zhang (For Jurcevic/Blank): P: [phone]  F: [phone]  Amberly Swan (For Krysta): P: [phone]  F: [phone]  Kriss Irvin (For Aubrie/Fer/Zakhary): P: [phone]  F: [phone]  Teresita Kan (For Gonzalez/Casper): P: [phone]  F: [phone]     Adult Sleep Medicine Advanced Practice Providers:  Juan Manuel Hodge ([place], [place])  Kristina Monroe ([facility])  Cecile Singletary CNP ([facility])  Delma Recinos CNP ([place], [place], [place])  Shana Casper CNP ([facility])        OUR SLEEP TESTING LOCATIONS     Our team will contact you to  "schedule your sleep study, however, you can contact us as follow:  Main Phone Line (scheduling only): [phone]), option 3  Adult and Pediatric Locations   [place] (6 years and older): [facility] - 4th floor ([address], [facility]) After hours line: [phone]  [facility] (Main campus: All ages): [facility], 6th floor. After hours line: [phone]   [place] (5 years and older; younger considered on case-by-case basis): [address]; [address], [address]. Scheduling  After hours line: [phone]   [place] (6 years and older): [address]; [address]; [address]   [place] (6 years and older): [address] [facility], [address]  After hours line: [phone]   [place] (13 years and older) in [place]: [address], 2nd floor  After hours line: [phone]   [place] (13 year and older): [address]  After hours line: [phone]     Adult Only Locations:   [place] (18 years and older): [address] [facility], 2nd floor   [place] (18 years and older): [address] [facility]; 4th floor  After hours line: [phone]   [place] (18 years and older) at [place]: [id number] [place] [address]  After hours line: [phone]          CONTACTING YOUR SLEEP MEDICINE PROVIDER     Send a message directly to your provider through \"My Chart\", which is the email service through your  Records Account: [email or web address]   Call [phone] and leave a message. One of the administrative assistants will forward the message to your sleep medicine provider through \"My Chart\" and/or email.     Your sleep medicine provider for this visit was: Eulalia Recinos PA-C    "

## 2025-01-02 NOTE — PROGRESS NOTES
Patient: Gurdeep Pritchett    [id number]  : 2005 -- AGE 19 y.o.    Provider: Eulalia Recinos PA-C     Location [facility]   Service Date: 2025              [facility]  New Visit Note      HISTORY OF PRESENT ILLNESS     The patient's referring provider is: Charity Spencer, *; Charity Spencer MD    HISTORY OF PRESENT ILLNESS   Gurdeep Pritchett is a 19 y.o. male with h/o Down Syndrome, developmental delay who presents to a [facility] for a sleep medicine evaluation with concerns of Sleep Apnea (New patient,).     Past Sleep History  Patient has not had a sleep study in the past.        Current History  History by patient/Mother who is present.  On today's visit, mom reports that patient was referred by PCP for sleep evaluation. States maybe twice per month Gurdeep wakes in the middle of the night yelling out when he is having a nightmare, unsure of the content of the dreams. States that she usually helps to calm him down and get him back to sleep. He sleeps in his own room. Other than these occasional instances, she is not aware of him waking during the night. She does not note him snoring, gasping for air or pause breathing when he occasionally dozes off on the couch. He follows a regular sleep schedule during the week and sleeps in on the weekends.     Patient himself does not indicate any specific sleep issues.  Mom does notice he seems tired at times during the day and has difficulty waking up in the morning. Sometimes he will take 20-40 minutes to get out of bed and can become agitated and combative toward mom when she is trying to wake him up. States he is never like this toward others or at any other times during the day.    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  In bed: 10P  Subjective sleep latency:  quickly   Awakenings during night:  occasional - ?nightmares,  cries out, maybe twice per month  Final awakening time:  6A  Naps: none    Overall estimate of total sleep time: 8 hours  Weekends/Days off: 11P-8A    Preferred sleeping position: SLEEP POSITION: sidelying      ESS:  11 - filled out by mom  RODRI:  6 - filled out by mom      REVIEW OF SYSTEMS     REVIEW OF SYSTEMS  See HPI; all other ROS were reviewed and negative for compliant        ALLERGIES AND MEDICATIONS     ALLERGIES  No Known Allergies    MEDICATIONS  Current Outpatient Medications   Medication Sig Dispense Refill    albuterol 2.5 mg /3 mL (0.083 %) nebulizer solution Take 3 mL (2.5 mg) by nebulization every 4 hours if needed for wheezing. 75 mL 3    benzoyl peroxide 5 % external wash Apply topically.      betamethasone valerate (Valisone) 0.1 % ointment Apply topically 2 times a day. Apply to affected area 45 g 2    calcium carbonate-vitamin D3 500 mg-5 mcg (200 unit) tablet Take 1 tablet by mouth once daily. Take by mouth 1 daily      carbamide peroxide (Debrox) 6.5 % otic solution Administer into affected ear(s).      clindamycin (Cleocin T) 1 % lotion Thin coat to to affected areas daily. 60 mL 8    escitalopram (Lexapro) 20 mg tablet Take 1 tablet (20 mg) by mouth once daily. 90 tablet 0    hydrocortisone 2.5 % cream apply Thin coat to affected skin on arms and corners of mouth twice daily for 3-4 weeks as needed. 453.6 g 2    ketoconazole (NIZOral) 2 % cream Daily to corners of mouth until controlled, then 1-2 times weekly. 15 g 3    minocycline 50 mg capsule TAKE 1 Capsule twice a day by mouth with full glass of water. Don't take just prior to bedtime. Wear sunscreen while taking this medication. 60 capsule 1    mupirocin (Bactroban) 2 % ointment Apply 1 Application topically see administration instructions. Apply as directed by physician       No current facility-administered medications for this visit.         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    PAST SURGICAL HISTORY:  Past Surgical History:   Procedure Laterality Date    OTHER SURGICAL HISTORY  06/06/2014    Proctotomy    TONSILLECTOMY  06/06/2014    Tonsillectomy With Adenoidectomy    TYMPANOSTOMY TUBE PLACEMENT  06/06/2014    Ear Pressure Equalization Tube, Insertion, Bilaterally       FAMILY HISTORY  No family history on file.    He does not have a family history of sleep disorder.    SOCIAL HISTORY  He  reports that he has never smoked. He has never used smokeless tobacco. No history on file for alcohol use and drug use. He    Caffeine consumption: No  Alcohol consumption: No  Marijuana: No      PHYSICAL EXAM     VITAL SIGNS: /68 (BP Location: Left arm, Patient Position: Sitting, BP Cuff Size: Large adult)   Pulse 76   Ht 1.619 m (5' 3.74\")   Wt 95.6 kg (210 lb 12.2 oz)   SpO2 98%   BMI 36.47 kg/m²      CURRENT WEIGHT:   Vitals:    01/02/25 1528   Weight: 95.6 kg (210 lb 12.2 oz)     Body mass index is 36.47 kg/m².  PREVIOUS WEIGHTS:  Wt Readings from Last 3 Encounters:   01/02/25 95.6 kg (210 lb 12.2 oz)   07/15/24 95.3 kg (210 lb)   03/19/24 95.5 kg (210 lb 8.6 oz)       Constitutional: Alert and oriented, cooperative, no acute distress  Head: Normocephalic, atraumatic   Cranial Features: No abnormal craniofacial features     Upper Airway Examination:  Mallampati Class: 3  Narrow palate  Tonsil stGstrstastdstest:st st1st Tongue - generous sized  Uvula: midline    Neck: Supple. Trachea midline.  Pulmonary: Non-labored breathing, speaks in full sentences.   Cardiac: regular " "rate   Extremities: No clubbing, no edema  Neuromuscular: Cranial nerves grossly intact, no focal deficits      RESULTS/DATA     No results found for: \"CO2\", \"IRON\", \"TRANSFERRIN\", \"IRONSAT\", \"TIBC\", \"FERRITIN\"          ASSESSMENT/PLAN     Mr. Pritchett is a 19 y.o. male and He was referred to the [facility] for evaluation of MITCHELL    Problem List, Orders, Assessment, Recommendations:  Problem List Items Addressed This Visit             ICD-10-CM    Sleep apnea - Primary G47.30     SLEEP APNEA - SUSPECTED- daytime sleepiness, Down's Syndrome  -Sleep study ordered: in lab (discussed in lab vs home test options, mom will stay at lab with patient)  .  -Diet, exercise, and weight loss were emphasized today in clinic, as were non-supine sleep --> reviewed this with mom            Relevant Orders    In-Center Sleep Study     Other Visit Diagnoses         Codes    Down's syndrome ([facility])     Q90.9            Disposition    -call with results; mom is Mar    "

## 2025-01-03 PROBLEM — G47.30 SLEEP APNEA: Status: ACTIVE | Noted: 2025-01-03

## 2025-01-03 NOTE — ASSESSMENT & PLAN NOTE
SLEEP APNEA - SUSPECTED- daytime sleepiness, Down's Syndrome  -Sleep study ordered: in lab (discussed in lab vs home test options, mom will stay at lab with patient)  .  -Diet, exercise, and weight loss were emphasized today in clinic, as were non-supine sleep --> reviewed this with mom

## 2025-01-20 ENCOUNTER — APPOINTMENT (OUTPATIENT)
Dept: ENDOCRINOLOGY | Facility: CLINIC | Age: 20
End: 2025-01-20
Payer: COMMERCIAL

## 2025-01-20 DIAGNOSIS — R46.89 BEHAVIOR CONCERN: ICD-10-CM

## 2025-01-20 RX ORDER — ESCITALOPRAM OXALATE 20 MG/1
20 TABLET ORAL DAILY
Qty: 90 TABLET | Refills: 0 | Status: SHIPPED | OUTPATIENT
Start: 2025-01-20

## 2025-01-29 ENCOUNTER — PROCEDURE VISIT (OUTPATIENT)
Dept: SLEEP MEDICINE | Facility: CLINIC | Age: 20
End: 2025-01-29
Payer: COMMERCIAL

## 2025-01-29 VITALS
HEIGHT: 64 IN | DIASTOLIC BLOOD PRESSURE: 74 MMHG | BODY MASS INDEX: 35.98 KG/M2 | SYSTOLIC BLOOD PRESSURE: 113 MMHG | WEIGHT: 210.76 LBS

## 2025-01-29 DIAGNOSIS — G47.30 SLEEP APNEA, UNSPECIFIED TYPE: ICD-10-CM

## 2025-01-29 ASSESSMENT — SLEEP AND FATIGUE QUESTIONNAIRES
HOW LIKELY ARE YOU TO NOD OFF OR FALL ASLEEP WHILE WATCHING TV: MODERATE CHANCE OF DOZING
HOW LIKELY ARE YOU TO NOD OFF OR FALL ASLEEP WHILE SITTING AND READING: HIGH CHANCE OF DOZING
ESS-CHAD TOTAL SCORE: 15
HOW LIKELY ARE YOU TO NOD OFF OR FALL ASLEEP WHILE LYING DOWN TO REST IN THE AFTERNOON WHEN CIRCUMSTANCES PERMIT: HIGH CHANCE OF DOZING
SITING INACTIVE IN A PUBLIC PLACE LIKE A CLASS ROOM OR A MOVIE THEATER: MODERATE CHANCE OF DOZING
HOW LIKELY ARE YOU TO NOD OFF OR FALL ASLEEP WHILE SITTING QUIETLY AFTER LUNCH WITHOUT ALCOHOL: MODERATE CHANCE OF DOZING
HOW LIKELY ARE YOU TO NOD OFF OR FALL ASLEEP WHILE SITTING AND TALKING TO SOMEONE: WOULD NEVER DOZE
HOW LIKELY ARE YOU TO NOD OFF OR FALL ASLEEP IN A CAR, WHILE STOPPED FOR A FEW MINUTES IN TRAFFIC: WOULD NEVER DOZE
HOW LIKELY ARE YOU TO NOD OFF OR FALL ASLEEP WHEN YOU ARE A PASSENGER IN A CAR FOR AN HOUR WITHOUT A BREAK: HIGH CHANCE OF DOZING

## 2025-01-30 ENCOUNTER — TELEPHONE (OUTPATIENT)
Dept: PEDIATRICS | Facility: CLINIC | Age: 20
End: 2025-01-30
Payer: COMMERCIAL

## 2025-01-30 NOTE — PROGRESS NOTES
[facility] TECH NOTE:     Patient: Gurdeep Pritchett   MRN//AGE: [id number]  2005  19 y.o.   Technologist: Agustina George   Room: 3   Service Date: 2025        Sleep Testing Location: [facility]:     TECHNOLOGIST SLEEP STUDY PROCEDURE NOTE:   This sleep study is being conducted according to the policies and procedures outlined by the AAS accreditation standards.  The sleep study procedure and processes involved during this appointment was explained to the patient/patient’s family, questions were answered. The patient/family verbalized understanding.      The patient is a 19 y.o. year old male scheduled for aDiagnostic PSG Split night with montage of:  Diagnostic PSG Split night . he arrived for his appointment.      The study that was ultimately completed was a Diagnostic PSG Split night with montage of:  Diagnostic PSG Split night .    The full study Was completed.  Patient questionnaires completed?: yes     Consents signed? yes    Initial Fall Risk Screening:     Gurdeep has not fallen in the last 6 months. his did not result in injury. Gurdeep does not have a fear of falling. He does not need assistance with sitting, standing, or walking. he does not need assistance walking in his home. he does not need assistance in an unfamiliar setting. The patient is not using an assistive device.     Brief Study observations: Patient was accompanied by his mother who acted as caregiver for the study. CPAP trial performed.  All sensors applied.  Patient qualified for split. Full study completed.     Deviation to order/protocol and reason: None      If PAP, which was preferred mask/pressure/mode: CPAP. Respironics Wisp nasal mask size S/M.    Other:None    After the procedure, the patient/family was informed to ensure followup with ordering clinician for testing results.      Technologist: BRENNEN Angel

## 2025-02-17 ENCOUNTER — TELEPHONE (OUTPATIENT)
Dept: SLEEP MEDICINE | Facility: CLINIC | Age: 20
End: 2025-02-17
Payer: COMMERCIAL

## 2025-02-17 NOTE — TELEPHONE ENCOUNTER
LVM on 2/10 at 1:10PM and today 2/17 at 12:20PM for Valerio Manuel phone number [phone] to review sleep study results.  Call back number for sleep office provided.

## 2025-02-19 ENCOUNTER — TELEPHONE (OUTPATIENT)
Dept: SLEEP MEDICINE | Facility: CLINIC | Age: 20
End: 2025-02-19
Payer: COMMERCIAL

## 2025-02-19 DIAGNOSIS — G47.30 SLEEP APNEA, UNSPECIFIED TYPE: Primary | ICD-10-CM

## 2025-02-19 NOTE — TELEPHONE ENCOUNTER
Mom of patient, Mar feels uncomfortable with results of in lb testing as patient slept on his back while there, which she states he does not do at home. She is requesting he be retested as  HST in his own environment   
Vaccine status unknown

## 2025-03-31 ENCOUNTER — APPOINTMENT (OUTPATIENT)
Dept: DERMATOLOGY | Facility: CLINIC | Age: 20
End: 2025-03-31
Payer: COMMERCIAL

## 2025-03-31 DIAGNOSIS — L73.2 HIDRADENITIS SUPPURATIVA: Primary | ICD-10-CM

## 2025-03-31 DIAGNOSIS — K13.0 ANGULAR CHEILITIS: ICD-10-CM

## 2025-03-31 PROCEDURE — 99214 OFFICE O/P EST MOD 30 MIN: CPT | Performed by: NURSE PRACTITIONER

## 2025-03-31 PROCEDURE — 1036F TOBACCO NON-USER: CPT | Performed by: NURSE PRACTITIONER

## 2025-03-31 NOTE — PROGRESS NOTES
Subjective     Gurdeep Pritchett is a 19 y.o. male who presents for the following: Hidradenitis Suppurativa (Pt is accompanied by mother Mar.  Pt presents to office for evaluation of hidradenitits suppurativa.).     Review of Systems:  No other skin or systemic complaints other than what is documented elsewhere in the note.    The following portions of the chart were reviewed this encounter and updated as appropriate:  Tobacco  Allergies  Meds  Problems  Med Hx  Surg Hx  Fam Hx       Skin Cancer History  No skin cancer on file.    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    Past Surgical History:  Gurdeep Pritchett  has a past surgical history that includes Other surgical history (06/06/2014); Tympanostomy tube placement (06/06/2014); and Tonsillectomy (06/06/2014).    Family History:  Patient family history is not on file.    Social History:  Gurdeep Pritchett  reports that he has never smoked. He has never used smokeless tobacco. No history on file for alcohol use and drug use.    Allergies:  Patient has no known allergies.    Current Medications / CAM's:    Current Outpatient Medications:     albuterol 2.5 mg /3 mL (0.083 %) nebulizer solution, Take 3 mL (2.5 mg) by nebulization every 4 hours if needed for wheezing., Disp: 75 mL, Rfl: 3    benzoyl peroxide 5 % external wash, Apply topically., Disp: , Rfl:     betamethasone valerate (Valisone) 0.1  % ointment, Apply topically 2 times a day. Apply to affected area, Disp: 45 g, Rfl: 2    calcium carbonate-vitamin D3 500 mg-5 mcg (200 unit) tablet, Take 1 tablet by mouth once daily. Take by mouth 1 daily, Disp: , Rfl:     carbamide peroxide (Debrox) 6.5 % otic solution, Administer into affected ear(s)., Disp: , Rfl:     clindamycin (Cleocin T) 1 % lotion, Thin coat to to affected areas daily., Disp: 60 mL, Rfl: 8    escitalopram (Lexapro) 20 mg tablet, Take 1 tablet (20 mg) by mouth once daily., Disp: 90 tablet, Rfl: 0    hydrocortisone 2.5 % cream, apply Thin coat to affected skin on arms and corners of mouth twice daily for 3-4 weeks as needed., Disp: 453.6 g, Rfl: 2    ketoconazole (NIZOral) 2 % cream, Daily to corners of mouth until controlled, then 1-2 times weekly., Disp: 15 g, Rfl: 3    minocycline 50 mg capsule, TAKE 1 Capsule twice a day by mouth with full glass of water. Don't take just prior to bedtime. Wear sunscreen while taking this medication., Disp: 60 capsule, Rfl: 1    mupirocin (Bactroban) 2 % ointment, Apply 1 Application topically see administration instructions. Apply as directed by physician, Disp: , Rfl:      Objective   Well appearing patient in no apparent distress; mood and affect are within normal limits.    A focused skin examination was performed. All findings within normal limits unless otherwise noted below.    Assessment/Plan   1. Hidradenitis suppurativa  Left Axilla, Right Axilla, Right Medial Thigh  Dilated comedones, inflammatory papules, sinus tract formation, scarring    - Hidradenitis suppurativa is a chronic condition of clogged sweat glands that leads to inflammation of the skin in areas such as the groin, underarms, underneath the breasts, and in between the buttocks. It most commonly appears as multiple large nodules (solid, raised bumps), abscesses (red, swollen, warm, tender bumps or lumps with pus inside), and tunnels (holes in the skin that may contain fluid such as  pus) in these areas. The nodules and abscesses gradually get larger and drain pus. After multiple bouts of this cycle of plugging, enlargement, and drainage, there may be tunnel formation under the skin and scarring. Pain is the most common symptom, but individuals with hidradenitis suppurativa also report itchy lesions, a foul odor coming from lesions when they drain pus, feeling tired, and joint pain.  - While there is no cure for hidradenitis suppurativa, you can work with your medical professional to treat existing lesions and prevent new ones.  - Make sure to wash any inflamed, draining areas of hidradenitis suppurativa with antibacterial soap, and then apply an antibiotic ointment (Neosporin) and clean bandages. If there is a large amount of drainage, change the gauze pads and dressings often. Warm compresses and ibuprofen (Advil, Motrin) can help reduce the swelling. Avoid wearing tight-fitting clothing to help prevent further irritation.  - Weight loss may decrease lesions by decreasing skin folds and, thus, friction on the skin. Smoking should be avoided.  Plan  - Continue clindamycin lotion, use as directed. Please call if you need refills.   - Discontinue minocycline for now, the condition is stable.   - Please call me if the Gurdeep's condition flares.   - Risks, benefits, and side effects discussed in office.     Related Medications  minocycline 50 mg capsule  TAKE 1 Capsule twice a day by mouth with full glass of water. Don't take just prior to bedtime. Wear sunscreen while taking this medication.    clindamycin (Cleocin T) 1 % lotion  Thin coat to to affected areas daily.    2. Angular cheilitis  Left Oral Commissure, Right Oral Commissure  Erythema and fissures of oral commissures.     - Angular cheilitis, sometimes called perlèche, is long-term inflammation of the corners of the mouth. It can be associated with a fungal (candidal) or bacterial (staphylococcal) infection, and those affected may also  have oral candidiasis (thrush). It may be a result of irritation and more rarely may occur from an underlying nutritional deficiency. The corners of the mouth may be uncomfortable or slightly painful. Angular cheilitis can last from days to months, depending on whether treatment is sought, and the condition may come and go.  - Start hydrocortisone cream and continue ketoconazole cream as discussed. Please call if you need refills    - Risks, benefits, and side effects discussed with patient.       Follow up in 12 months. Please call me if there are any changes or development of concerning symptoms (lesion/skin condition is changing, bleeding, enlarging, or worsening).

## 2025-04-14 DIAGNOSIS — B35.3 TINEA PEDIS OF LEFT FOOT: ICD-10-CM

## 2025-04-14 RX ORDER — KETOCONAZOLE 20 MG/G
CREAM TOPICAL
Qty: 15 G | Refills: 3 | Status: SHIPPED | OUTPATIENT
Start: 2025-04-14

## 2025-04-14 NOTE — TELEPHONE ENCOUNTER
Pt mom contacted office and is requesting refill of ketoconazole cream 2% be sent to pharmacy on file. Message sent to DANIAL.    Greg Womack LPN

## 2025-05-20 DIAGNOSIS — R46.89 BEHAVIOR CONCERN: ICD-10-CM

## 2025-05-21 RX ORDER — ESCITALOPRAM OXALATE 20 MG/1
20 TABLET ORAL DAILY
Qty: 90 TABLET | Refills: 0 | Status: SHIPPED | OUTPATIENT
Start: 2025-05-21

## 2025-07-28 ENCOUNTER — APPOINTMENT (OUTPATIENT)
Dept: PEDIATRICS | Facility: CLINIC | Age: 20
End: 2025-07-28
Payer: COMMERCIAL

## 2025-07-28 VITALS
HEIGHT: 65 IN | TEMPERATURE: 98.1 F | DIASTOLIC BLOOD PRESSURE: 64 MMHG | WEIGHT: 212.2 LBS | BODY MASS INDEX: 35.35 KG/M2 | SYSTOLIC BLOOD PRESSURE: 100 MMHG

## 2025-07-28 DIAGNOSIS — Z00.00 WELL ADULT HEALTH CHECK: Primary | ICD-10-CM

## 2025-07-28 DIAGNOSIS — Q90.9 DOWN'S SYNDROME (HHS-HCC): ICD-10-CM

## 2025-07-28 DIAGNOSIS — R46.89 BEHAVIOR CONCERN: ICD-10-CM

## 2025-07-28 DIAGNOSIS — E78.1 HIGH BLOOD TRIGLYCERIDES: ICD-10-CM

## 2025-07-28 PROCEDURE — 99395 PREV VISIT EST AGE 18-39: CPT | Performed by: PEDIATRICS

## 2025-07-28 PROCEDURE — 3008F BODY MASS INDEX DOCD: CPT | Performed by: PEDIATRICS

## 2025-07-28 PROCEDURE — 1036F TOBACCO NON-USER: CPT | Performed by: PEDIATRICS

## 2025-07-28 RX ORDER — ESCITALOPRAM OXALATE 20 MG/1
20 TABLET ORAL DAILY
Qty: 30 TABLET | Refills: 2 | Status: SHIPPED | OUTPATIENT
Start: 2025-07-28 | End: 2025-07-28 | Stop reason: ENTERED-IN-ERROR

## 2025-07-28 RX ORDER — ESCITALOPRAM OXALATE 20 MG/1
20 TABLET ORAL DAILY
Qty: 30 TABLET | Refills: 5 | Status: SHIPPED | OUTPATIENT
Start: 2025-07-28 | End: 2026-01-24

## 2025-07-28 NOTE — PROGRESS NOTES
Subjective   Patient ID: Gurdeep Pritchett is a 20 y.o. male who presents for Well Child.  History of Present Illness  The patient is a 20-year-old male who presents for evaluation of Down syndrome, behavioral concerns, elevated triglycerides, obstructive sleep apnea, vision concerns, gastrointestinal loose stools, and hidradenitis. He is accompanied by his mother.    He has been under the care of a gastroenterologist, Dr. Espinal, at [facility]. Initially, the fiber supplements were effective, but their efficacy has since diminished. His mother is keen to avoid any invasive procedures to check his nerves. Benefiber has not been tried yet. He will be seeing Dr. Espinal in 2 weeks.    He has been diagnosed with hidradenitis on his arms and knees. The condition recurs when topical treatments are discontinued. His mother has taken photographs of his underarm condition for reference. His last dermatology visit was in March 2025.    He underwent a sleep study that confirmed sleep apnea. Initially, he used the CPAP machine intermittently for 2 to 3 hours, but now he uses it for about 7.5 hours. His sleep quality has improved significantly, and he no longer wakes up in an agitated state. His morning demeanor and behavior have also improved. His father is not comfortable using the CPAP machine at home. He was advised to use the CPAP machine for at least 4 to 7 nights a week.    He continues to have elevated triglycerides.    He is still doing his speech therapy with Susana Higgins twice a month. His aggressive behavior has decreased since starting CPAP therapy. It still takes him 20 to 25 minutes to get out of bed, but he no longer screams or flails his arms. He has become more cooperative and independent, managing tasks such as showering, dressing, and preparing breakfast without prompting. He is currently on Lexapro 20 mg.    Supplemental Information: He has not required albuterol for a long time, with the last use being  "during a cold last winter.    Nutrition/Diet: He was recommended to drink almond milk by a dietitian but refused after the first glass. He has gained 2 pounds since January 2025.    Sleep: He uses a CPAP machine for about 7.5 hours each night, which has significantly improved his sleep quality and morning demeanor.    Dental Health: He is making efforts to brush his teeth independently. He has biannual dental check-ups and cleanings every 3 months. His orthodontist has retired, and he was advised to wear a retainer, which he has chewed up. His teeth are optimally positioned to allow for canine eruption, but this has not occurred. During his last cleaning, significant decay was found in a back tooth, and periodontal disease was diagnosed.    Elimination: He is currently taking fiber gummies twice daily to manage his bowel movements, which vary in consistency from pudding-like to more solid.    Vision: His last eye examination was in November 2024, with no changes in prescription noted. He was referred to [facility]. He saw them in November 2024. He was told that nothing has changed, the nystagmus is there, the retinal degeneration is still there, and the nearsightedness is there.    Past Medical/Surgical History: He had his wisdom teeth removed about 7 years ago and handled the post-surgical procedure well.      Review of Systems    Objective     /64   Temp 36.7 °C (98.1 °F)   Ht 1.658 m (5' 5.27\")   Wt 96.3 kg (212 lb 3.2 oz)   BMI 35.02 kg/m²      Physical Exam  General Appearance: Alert, no acute distress.  Vital signs: Within normal limits.  HEENT: Facial features consistent with Down syndrome. Normal ears bilaterally. Wearing glasses, nystagmus present. Normal mouth/throat.  Respiratory: Clear to auscultation bilaterally.  Cardiovascular: Regular rate and rhythm.  Gastrointestinal: Protuberant, soft, nontender abdomen. No masses or organomegaly.  Back, Musculoskeletal: Full range of motion in " all extremities. Wearing AFOs on feet.  Neurological: Normal for this patient.  Psychiatric: Normal.    Assessment & Plan  Down syndrome  He has Down syndrome facies and features. He is wearing AFOs on his feet.    Behavioral concerns  He is currently on Lexapro 20 mg one tablet daily. A six-month supply of Lexapro 20 mg one tablet daily has been provided. If he continues to respond well to the medication, refills can be requested as needed.  Treatment plan: Lexapro 20 mg one tablet daily.    Elevated triglycerides  His triglyceride levels remain elevated in the 200s.  Diagnostic plan: A referral to adult cardiology has been made for potential medication management. Fasting labs will be ordered.    Obstructive sleep apnea  He has been using CPAP therapy consistently for about 7-8 hours per night, which has improved his sleep quality and morning demeanor.    Vision concerns  He has nystagmus and nearsightedness with a prescription of -9.5. He had an eye exam last year with no changes in his prescription.    Gastrointestinal loose stools  He is taking fiber gummies to help with bowel movements.  Treatment plan: Benefiber half a dose daily has been recommended to manage loose stools.    Hidradenitis  He has hidradenitis on his arms and knees.  Treatment plan: This will be managed by dermatology.    Charity Spencer MD     This medical note was created with the assistance of artificial intelligence (AI) for documentation purposes. The content has been reviewed and confirmed by the healthcare provider for accuracy and completeness. Patient consented to the use of audio recording and use of AI during their visit.

## 2025-07-28 NOTE — PATIENT INSTRUCTIONS
Patient Information:  Name: Gurdeep  Age: 20  Medical History: Gurdeep has Down syndrome, behavioral concerns, elevated triglycerides, obstructive sleep apnea, vision concerns, gastrointestinal issues, and hidradenitis. He has been under the care of various specialists including a gastroenterologist, dermatologist, and eye doctor.     Reason for Visit:  Gurdeep visited for evaluation of his ongoing health issues including Down syndrome, behavioral concerns, elevated triglycerides, obstructive sleep apnea, vision concerns, gastrointestinal loose stools, and hidradenitis. His mother accompanied him and provided updates on his condition and treatments.     Clinical Findings:  Gurdeep's physical exam showed he is alert and in no acute distress. He has facial features consistent with Down syndrome, normal ears, nystagmus, and wears glasses. His lungs are clear, heart has a regular rate and rhythm, and his abdomen is protuberant but soft and nontender. He has full range of motion in all extremities and wears AFOs on his feet.  Elevated triglycerides in the 200s.  Sleep study confirmed obstructive sleep apnea.     Diagnosis:  Down syndrome  Behavioral concerns  Elevated triglycerides  Obstructive sleep apnea  Vision concerns (nystagmus, nearsightedness)  Gastrointestinal loose stools  Hidradenitis     Treatment Plan:  Lexapro 20 mg one tablet daily for behavioral concerns.  Referral to adult cardiology for elevated triglycerides and potential medication management.  Benefiber half a dose daily for gastrointestinal loose stools.  Continued use of CPAP therapy for obstructive sleep apnea.  Dermatology management for hidradenitis.     Follow-Up Instructions:  Continue taking Lexapro 20 mg one tablet daily. Refills can be requested as needed.  Schedule an appointment with adult cardiology for elevated triglycerides.  Start taking Benefiber half a dose daily to manage loose stools.  Use CPAP machine consistently for at least 4 to 7  nights a week.  Follow up with dermatology for hidradenitis management.  Gurdeep will see Dr. Espinal in 2 weeks for gastrointestinal issues.  Gurdeep should continue his speech therapy sessions twice a month.     Additional Notes:  Gurdeep's mother is keen to avoid invasive procedures for gastrointestinal issues.  Gurdeep's vision concerns are being monitored, with no changes in prescription noted during his last eye exam.  Woodys dental health requires attention due to significant decay and periodontal disease. His orthodontist has retired, and he has chewed up his retainer.  Gurdeep's behavior has improved significantly since starting CPAP therapy, and he is more cooperative and independent in daily tasks.  Gurdeep's mother is concerned about airway management during dental procedures due to his obstructive sleep apnea.

## 2025-08-07 ENCOUNTER — APPOINTMENT (OUTPATIENT)
Dept: SLEEP MEDICINE | Facility: CLINIC | Age: 20
End: 2025-08-07
Payer: COMMERCIAL

## 2025-08-15 LAB
ALBUMIN SERPL-MCNC: 4.2 G/DL (ref 3.6–5.1)
ALP SERPL-CCNC: 73 U/L (ref 36–130)
ALT SERPL-CCNC: 30 U/L (ref 9–46)
ANION GAP SERPL CALCULATED.4IONS-SCNC: 7 MMOL/L (CALC) (ref 7–17)
AST SERPL-CCNC: 18 U/L (ref 10–40)
BASOPHILS # BLD AUTO: 39 CELLS/UL (ref 0–200)
BASOPHILS NFR BLD AUTO: 0.7 %
BILIRUB SERPL-MCNC: 0.5 MG/DL (ref 0.2–1.2)
BUN SERPL-MCNC: 18 MG/DL (ref 7–25)
CALCIUM SERPL-MCNC: 8.9 MG/DL (ref 8.6–10.3)
CHLORIDE SERPL-SCNC: 105 MMOL/L (ref 98–110)
CHOLEST SERPL-MCNC: 157 MG/DL
CHOLEST/HDLC SERPL: 4.8 (CALC)
CO2 SERPL-SCNC: 28 MMOL/L (ref 20–32)
CREAT SERPL-MCNC: 0.87 MG/DL (ref 0.6–1.24)
EGFRCR SERPLBLD CKD-EPI 2021: 125 ML/MIN/1.73M2
EOSINOPHIL # BLD AUTO: 28 CELLS/UL (ref 15–500)
EOSINOPHIL NFR BLD AUTO: 0.5 %
ERYTHROCYTE [DISTWIDTH] IN BLOOD BY AUTOMATED COUNT: 16 % (ref 11–15)
GLUCOSE SERPL-MCNC: 87 MG/DL (ref 65–99)
HCT VFR BLD AUTO: 41.6 % (ref 38.5–50)
HDLC SERPL-MCNC: 33 MG/DL
HGB BLD-MCNC: 13.3 G/DL (ref 13.2–17.1)
LDLC SERPL CALC-MCNC: 97 MG/DL (CALC)
LYMPHOCYTES # BLD AUTO: 1826 CELLS/UL (ref 850–3900)
LYMPHOCYTES NFR BLD AUTO: 32.6 %
MCH RBC QN AUTO: 29.2 PG (ref 27–33)
MCHC RBC AUTO-ENTMCNC: 32 G/DL (ref 32–36)
MCV RBC AUTO: 91.4 FL (ref 80–100)
MONOCYTES # BLD AUTO: 314 CELLS/UL (ref 200–950)
MONOCYTES NFR BLD AUTO: 5.6 %
NEUTROPHILS # BLD AUTO: 3394 CELLS/UL (ref 1500–7800)
NEUTROPHILS NFR BLD AUTO: 60.6 %
NONHDLC SERPL-MCNC: 124 MG/DL (CALC)
PLATELET # BLD AUTO: 276 THOUSAND/UL (ref 140–400)
PMV BLD REES-ECKER: 9.5 FL (ref 7.5–12.5)
POTASSIUM SERPL-SCNC: 4.5 MMOL/L (ref 3.5–5.3)
PROT SERPL-MCNC: 7.3 G/DL (ref 6.1–8.1)
RBC # BLD AUTO: 4.55 MILLION/UL (ref 4.2–5.8)
SODIUM SERPL-SCNC: 140 MMOL/L (ref 135–146)
TRIGL SERPL-MCNC: 173 MG/DL
TSH SERPL-ACNC: 1.89 MIU/L (ref 0.4–4.5)
WBC # BLD AUTO: 5.6 THOUSAND/UL (ref 3.8–10.8)

## 2025-09-04 ENCOUNTER — APPOINTMENT (OUTPATIENT)
Facility: CLINIC | Age: 20
End: 2025-09-04
Payer: COMMERCIAL

## 2025-09-04 PROBLEM — G47.33 OBSTRUCTIVE SLEEP APNEA SYNDROME: Status: ACTIVE | Noted: 2025-01-03

## 2025-09-04 ASSESSMENT — ENCOUNTER SYMPTOMS
OCCASIONAL FEELINGS OF UNSTEADINESS: 0
DEPRESSION: 0
LOSS OF SENSATION IN FEET: 0

## 2025-09-04 ASSESSMENT — COLUMBIA-SUICIDE SEVERITY RATING SCALE - C-SSRS
2. HAVE YOU ACTUALLY HAD ANY THOUGHTS OF KILLING YOURSELF?: NO
1. IN THE PAST MONTH, HAVE YOU WISHED YOU WERE DEAD OR WISHED YOU COULD GO TO SLEEP AND NOT WAKE UP?: NO
6. HAVE YOU EVER DONE ANYTHING, STARTED TO DO ANYTHING, OR PREPARED TO DO ANYTHING TO END YOUR LIFE?: NO

## 2025-09-04 ASSESSMENT — PATIENT HEALTH QUESTIONNAIRE - PHQ9
1. LITTLE INTEREST OR PLEASURE IN DOING THINGS: NOT AT ALL
SUM OF ALL RESPONSES TO PHQ9 QUESTIONS 1 AND 2: 0
2. FEELING DOWN, DEPRESSED OR HOPELESS: NOT AT ALL